# Patient Record
Sex: FEMALE | Race: OTHER | NOT HISPANIC OR LATINO | URBAN - METROPOLITAN AREA
[De-identification: names, ages, dates, MRNs, and addresses within clinical notes are randomized per-mention and may not be internally consistent; named-entity substitution may affect disease eponyms.]

---

## 2024-10-23 ENCOUNTER — OUTPATIENT (OUTPATIENT)
Dept: OUTPATIENT SERVICES | Facility: HOSPITAL | Age: 59
LOS: 1 days | End: 2024-10-23
Payer: MEDICARE

## 2024-10-23 VITALS
WEIGHT: 162.04 LBS | OXYGEN SATURATION: 97 % | RESPIRATION RATE: 18 BRPM | HEART RATE: 81 BPM | TEMPERATURE: 98 F | HEIGHT: 59 IN | SYSTOLIC BLOOD PRESSURE: 109 MMHG | DIASTOLIC BLOOD PRESSURE: 73 MMHG

## 2024-10-23 DIAGNOSIS — Z01.818 ENCOUNTER FOR OTHER PREPROCEDURAL EXAMINATION: ICD-10-CM

## 2024-10-23 DIAGNOSIS — Z96.651 PRESENCE OF RIGHT ARTIFICIAL KNEE JOINT: Chronic | ICD-10-CM

## 2024-10-23 DIAGNOSIS — Z96.89 PRESENCE OF OTHER SPECIFIED FUNCTIONAL IMPLANTS: Chronic | ICD-10-CM

## 2024-10-23 DIAGNOSIS — M17.12 UNILATERAL PRIMARY OSTEOARTHRITIS, LEFT KNEE: ICD-10-CM

## 2024-10-23 LAB
A1C WITH ESTIMATED AVERAGE GLUCOSE RESULT: 5.9 % — HIGH (ref 4–5.6)
BLD GP AB SCN SERPL QL: SIGNIFICANT CHANGE UP
ESTIMATED AVERAGE GLUCOSE: 123 MG/DL — HIGH (ref 68–114)
MRSA PCR RESULT.: DETECTED
S AUREUS DNA NOSE QL NAA+PROBE: DETECTED

## 2024-10-23 NOTE — H&P PST ADULT - NEGATIVE PSYCHIATRIC SYMPTOMS
no suicidal ideation/no mood swings/no agitation/no visual hallucinations/no auditory hallucinations

## 2024-10-23 NOTE — H&P PST ADULT - NEUROLOGICAL SYMPTOMS
migraine headache/vertigo/difficulty walking/headache migraine headache associated with vertigo and nausea/vertigo/difficulty walking/headache

## 2024-10-23 NOTE — H&P PST ADULT - PROBLEM SELECTOR PLAN 10
Latex:  Patient denies allergy to latex.  Medications reviewed with patient.  Tobacco use verified.  Allergies verified.    REFERRING MD:  Dr. 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  States that he has constant pain.  Has questions regarding PT~ states that he had an initial visit but he is \"waiting on an ok from insurance.\"    He is taking Gabapentin, Hydrocodone for pain.  Morphine caused nausea, ondansetron gave relief.    Would like some help with his brace.       s/p spinal cord stimulator on 8/2020.  Follow-up with Providers for management.

## 2024-10-23 NOTE — H&P PST ADULT - PROBLEM SELECTOR PLAN 6
Instructed to continue use of inhalers and use same on day of surgery.   Instructed to bring inhalers to hospital on day of surgery.  Follow-up with PCP for management if symptoms worsen.

## 2024-10-23 NOTE — H&P PST ADULT - NSICDXPASTMEDICALHX_GEN_ALL_CORE_FT
PAST MEDICAL HISTORY:  Anxiety     Asthma     Constipation     Depression     Heartburn     Migraine headache     Primary osteoarthritis of right knee     Psoriatic arthritis     Rheumatoid arthritis      PAST MEDICAL HISTORY:  Allergic rhinitis     Anxiety     Asthma     CAD (coronary artery disease)     Constipation     Depression     Fibromyalgia     GERD (gastroesophageal reflux disease)     Heartburn     Migraine headache     Primary osteoarthritis of right knee     Psoriatic arthritis     Rheumatoid arthritis

## 2024-10-23 NOTE — H&P PST ADULT - NSICDXPASTSURGICALHX_GEN_ALL_CORE_FT
PAST SURGICAL HISTORY:  Ankle fracture right ankle arthroscopy    DJD (degenerative joint disease) of cervical spine Plate and screws and 2 artificial discs.  Lumbar spine danilo and screws    H/O abdominal hysterectomy     H/O tubal ligation     History of arthroscopy of left shoulder     History of total knee replacement, right     S/P insertion of spinal cord stimulator     S/P right knee arthroscopy x 3     PAST SURGICAL HISTORY:  Ankle fracture right ankle arthroscopy    DJD (degenerative joint disease) of cervical spine Plate and screws and 2 artificial discs.  Lumbar spine danilo and screws    H/O abdominal hysterectomy     H/O tubal ligation     History of arthroscopy of left shoulder     History of laparoscopic cholecystectomy     History of total knee replacement, right     S/P insertion of spinal cord stimulator     S/P right knee arthroscopy x 3

## 2024-10-23 NOTE — H&P PST ADULT - NSICDXPROCEDURE_GEN_ALL_CORE_FT
PROCEDURES:  Right total knee replacement 23-Oct-2024 07:33:58  Sandie Martinez   PROCEDURES:  Left total knee replacement 23-Oct-2024 19:35:48  Sandie Martinez

## 2024-10-23 NOTE — H&P PST ADULT - PROBLEM SELECTOR PLAN 5
Instructed pt to continue meds, to take them with sips of water on day of surgery and to  follow-up with Provider for management.  Discussed with pt the availability of the suicide and crisis lifeline 988 that can be used in times of crisis.  Pt receptive to information.

## 2024-10-23 NOTE — H&P PST ADULT - DOES PATIENT HAVE ADVANCE DIRECTIVE
Pt's son Alfonzo [phone] and son Edgar Solitario [phone] will make decisions in case of emergency/No Pt's brother Guille [phone] and son Edgar Altaf [phone] will make decisions in case of emergency/No

## 2024-10-23 NOTE — H&P PST ADULT - NSICDXFAMILYHX_GEN_ALL_CORE_FT
FAMILY HISTORY:  Father  Still living? Yes, Estimated age: Age Unknown  Family history of rheumatoid arthritis, Age at diagnosis: Age Unknown  Family history of stroke, Age at diagnosis: Age Unknown  No family history of cardiac disease, Age at diagnosis: Age Unknown     FAMILY HISTORY:  Father  Still living? Yes, Estimated age: Age Unknown  Family history of heart disease, Age at diagnosis: Age Unknown  Family history of hypertension, Age at diagnosis: Age Unknown  Family history of rheumatoid arthritis, Age at diagnosis: Age Unknown  Family history of stroke, Age at diagnosis: Age Unknown    Mother  Still living? Yes, Estimated age: Age Unknown  Family history of diabetes mellitus, Age at diagnosis: Age Unknown  Family history of hypertension, Age at diagnosis: Age Unknown  Family history of ovarian cancer, Age at diagnosis: Age Unknown

## 2024-10-23 NOTE — H&P PST ADULT - NSANTHOSAYNRD_GEN_A_CORE
No. SHEBA screening performed.  STOP BANG Legend: 0-2 = LOW Risk; 3-4 = INTERMEDIATE Risk; 5-8 = HIGH Risk

## 2024-10-23 NOTE — CHART NOTE - NSCHARTNOTEFT_GEN_A_CORE
PRE-TJR SOCIAL/FUNCTIONAL SCREENING Via:    In Person Meet  on 10/23/2024:  Preferred Language:  English  Patient Telephone #:  [phone]  EMAIL ADDRESS:  [email or web address]  Insurance:  Medicare  Pt stated Goal for Surgery :  Walk m ore   SURGERY DETAILS:  Sx Date:  10/29/2024                                                                                           Surgery Type:  Left Knee Replacement  (had right knee done in 2015)  Surgeon: Dr. Laurie ESCALONA Tool:11/12 (directly home)  Attitude towards SDD:  Pt scheduled for SDA    SOCIAL HISTORY:  Live With: Alone in [facility] however will be staying with her son after surgery in an apartment without an elevator    Stairs Required to Access (Street to Front Door) Residence:   None, patient has ramp to get into the building    Once Inside Pt Residence:   To Access a Bathroom:      Must Go Up 13 Stairs  To Access a Bedroom Where Pt. Can Sleep:     Must Go Up 13 Stairs  Pt. Currently Has Formal Home Health Services:  No. Patient is requesting a home health aid post-op to assist, also interested in CDPAP if possible       MOBILITY HISTORY:   Baseline Ambulation- Pt is Independent in ambulation without assistive device:   Pt. Owns Any Other Medical Equipment?  No, patient will need rolling walker and 3-in-1 commode however it will be delivered in the next few days.    MEDICAL HISTORY:  Pt has any History of Back Surgery:   Yes; Had both cervical and lumbar surgeries and a spinal stimulator  Pt has any Allergies:    Yes; penicillin, Bactrim, Sulfa drugs  Patient denies diagnosis of sleep apnea or use of CPAP    Pt. Pharmacy Information:  [facility]   [facility]                 Address:        [address] [place] [address]  Telephone #: [phone]    Pt. will Require Transportation to Home Upon Discharge: No // Yes;  will be Made Aware:    For Post-Acute Care:  Pt. prefers [facility] for post-acute needs ////      Pt is Interested in a Different Care Provider    Pt electronically sent pre- op education book "What to do before and after knee replacement".  All details of upcoming procedure discussed including, precautions, the throughput process, post-op process including transportation, home-care and follow-up as well as important information regarding blood clots, pneumonia, falls, infection and pain.  All questions answered and pt. verbalized understanding.  Pt. has my phone number for follow up as needed. PRE-TJR SOCIAL/FUNCTIONAL SCREENING Via:    In Person Meet  on 10/23/2024:  Preferred Language:  English  Patient Telephone #:  [phone]  EMAIL ADDRESS:  [email or web address]  Insurance:  Medicare  Pt stated Goal for Surgery :  Walk m ore   SURGERY DETAILS:  Sx Date:  10/29/2024                                                                                           Surgery Type:  Left Knee Replacement  (had right knee done in 2015)  Surgeon: Dr. Laurie ESCALONA Tool:11/12 (directly home)  Attitude towards SDD:  Pt scheduled for SDA    SOCIAL HISTORY:  Live With: Alone in [facility] however will be staying with her son after surgery in an apartment without an elevator    Stairs Required to Access (Street to Front Door) Residence:   None, patient has ramp to get into the building    Once Inside Pt Residence:   To Access a Bathroom:      Must Go Up 13 Stairs  To Access a Bedroom Where Pt. Can Sleep:     Must Go Up 13 Stairs  Pt. Currently Has Formal Home Health Services:  No. Patient is requesting a home health aid post-op to assist, also interested in CDPAP if possible       MOBILITY HISTORY:   Baseline Ambulation- Pt is Independent in ambulation without assistive device:   Pt. Owns Any Other Medical Equipment?  No, patient will need rolling walker and 3-in-1 commode however it will be delivered in the next few days.    MEDICAL HISTORY:  Pt has any History of Back Surgery:   Yes; Had both cervical and lumbar surgeries and a spinal stimulator  Pt has any Allergies:    Yes; penicillin, Bactrim, Sulfa drugs  Patient denies diagnosis of sleep apnea or use of CPAP    Pt. Pharmacy Information:  Name:   [facility]                 Address:        [address] [place] [address]  Telephone #: [phone]    Pt. will Require Transportation to Home Upon Discharge: No, son will drive patient home    For Post-Acute Care:  Pt. prefers [facility] Home for post-acute needs    Pt electronically sent pre- op education book "What to do before and after knee replacement".  All details of upcoming procedure discussed including, precautions, the throughput process, post-op process including transportation, home-care and follow-up as well as important information regarding blood clots, pneumonia, falls, infection and pain.  Stretching strap provided for home exercise program.  All questions answered and pt. verbalized understanding.  Pt. has my phone number for follow up as needed.

## 2024-10-23 NOTE — H&P PST ADULT - PROBLEM SELECTOR PLAN 4
Instructed pt to continue meds, to avoid NSAIDS, and to follow-up with PCP/pain management if symptoms worsen.

## 2024-10-23 NOTE — H&P PST ADULT - PROBLEM SELECTOR PLAN 8
Instructed pt to continue meds, to avoid NSAIDS, and to follow-up with PCP/provider if symptoms worsen.

## 2024-10-23 NOTE — H&P PST ADULT - REASON FOR ADMISSION
left knee pain  Pt's goalis to walk better and to be pain free. left knee pain  Pt's goal is to walk better and to be pain free.

## 2024-10-23 NOTE — H&P PST ADULT - PROBLEM SELECTOR PLAN 2
Pt with mild CAD-on aspirin.  Optimized for procedure by cardiology.  Instructed to hold aspirin as per cardiologist's advice.  pt to follow-up with cardiology for management.

## 2024-10-23 NOTE — H&P PST ADULT - HISTORY OF PRESENT ILLNESS
This is a 59 yr old  female with PMH of presents with c/o knee pain due to osteoarthritis.Pt reports worsening of pain with prolonged ambulation and standing. Pt is scheduled for left total knee replacement on 10/29/24.   This is a 59 yr old  female with PMH of HLD, fibromyalgia, psoriatic arthritis, allergic rhinitis, GERD, migraine headache, anxiety, depression, PTSD, RA, asthma, PSAH of cervical spine fusion, lumbar fusion, low back pain -s/p spinal cord stimulator insertion on 8/13/20, who presents with c/o left knee pain due to osteoarthritis. Pt reports worsening of pain with prolonged ambulation and standing. Pt is scheduled for left total knee replacement on 10/29/24.   This is a 59 yr old  female with PMH of  mild CAD-on aspirin, HLD, fibromyalgia, psoriatic arthritis, allergic rhinitis, GERD, migraine headache, anxiety, depression, PTSD, RA, asthma, PSH of cervical spine fusion, lumbar fusion, low back pain -s/p spinal cord stimulator insertion on 8/13/20, who presents with c/o left knee pain due to osteoarthritis. Pt reports worsening of pain with prolonged ambulation and standing. Pt is scheduled for left total knee replacement on 10/29/24.   This is a 59 yr old  female with PMH of  mild CAD-on aspirin, HLD, fibromyalgia, psoriatic arthritis, allergic rhinitis, GERD, migraine headache associated with vertigo and nausea, anxiety, depression, PTSD, RA, asthma, PSH of cervical spine fusion, lumbar fusion, low back pain -s/p spinal cord stimulator insertion on 8/13/20, who presents with c/o left knee pain due to osteoarthritis. Pt reports worsening of pain with prolonged ambulation and standing. Pt is scheduled for left total knee replacement on 10/29/24.

## 2024-10-23 NOTE — H&P PST ADULT - MUSCULOSKELETAL
ROM intact/joint swelling/abnormal gait details… left knee/decreased ROM due to pain/joint swelling/abnormal gait

## 2024-10-23 NOTE — H&P PST ADULT - PROBLEM SELECTOR PLAN 1
Pt is scheduled for left total knee replacement on 10/29/24.  SHEBA stop bang score 3. Pt denies history of SHEBA, never did sleep study.  Preoperative instructions discussed with pt and copy given to pt.  Instructed pt not to eat or drink anything after midnight the night before the surgery, to avoid NSAIDs such as Ibuprofen, Motrin, Aleve, Advil, Naproxen before surgery, to take Tylenol if needed for pain, to report if she has been exposed to any one with any contagious diseases including Covid-19 or if she is exhibiting any symptoms of COVID-19.   pt swabbed for MRSA/MSSA.  Instructed pt to shower with Chlorhexidine 4% soap for 3 days including the morning of surgery to prevent infection and Mupirocin nasal ointment if nasal swab is positive for MSSA/MRSA before surgery. Verbalized understanding of instructions given via teach back method. Pt is scheduled for left total knee replacement on 10/29/24.  SHEBA stop bang score 3. Pt denies history of SHEBA, never did sleep study.  Preoperative instructions discussed with pt and copy given to pt.  Instructed pt not to eat or drink anything after midnight the night before the surgery, to avoid NSAIDs such as Ibuprofen, Motrin, Aleve, Advil, Naproxen before surgery, to take Tylenol if needed for pain, to report if she has been exposed to any one with any contagious diseases including Covid-19 or if she is exhibiting any symptoms of COVID-19.   Pt swabbed for MRSA/MSSA.  Instructed pt to shower with Chlorhexidine 4% soap for 3 days including the morning of surgery to prevent infection and Mupirocin nasal ointment if nasal swab is positive for MSSA/MRSA before surgery. Verbalized understanding of instructions given via teach back method.

## 2024-10-23 NOTE — H&P PST ADULT - NEGATIVE NEUROLOGICAL SYMPTOMS
no weakness/no paresthesias/no generalized seizures/no focal seizures/no syncope/no tremors/no loss of sensation

## 2024-10-23 NOTE — H&P PST ADULT - ASSESSMENT
This is a 59 yr old  female with PMH of HLD, fibromyalgia, psoriatic arthritis, allergic rhinitis, GERD, migraine headache, anxiety, depression, PTSD, RA, asthma, PSAH of cervical spine fusion, lumbar fusion, low back pain -s/p spinal cord stimulator insertion on 8/13/20, who presents with c/o left knee pain due to osteoarthritis. Pt reports worsening of pain with prolonged ambulation and standing. Pt is scheduled for left total knee replacement on 10/29/24.  SHEBA stop bang score 3. Pt denies history of SHEBA, never did sleep study.   This is a 59 yr old  female with PMH of  mild CAD-on aspirin, HLD, fibromyalgia, psoriatic arthritis, allergic rhinitis, GERD, migraine headache, anxiety, depression, PTSD, RA, asthma, PSH of cervical spine fusion, lumbar fusion, low back pain -s/p spinal cord stimulator insertion on 8/13/20, who presents with primary osteoarthritis of left knee. Pt is scheduled for left total knee replacement on 10/29/24.  SHEBA stop bang score 3. Pt denies history of SHEBA, never did sleep study.   This is a 59 yr old  female with PMH of  mild CAD-on aspirin, HLD, fibromyalgia, psoriatic arthritis, allergic rhinitis, GERD, migraine headache associated with vertigo and nausea, anxiety, depression, PTSD, RA, asthma, PSH of cervical spine fusion, lumbar fusion, low back pain -s/p spinal cord stimulator insertion on 8/13/20, who presents with primary osteoarthritis of left knee. Pt is scheduled for left total knee replacement on 10/29/24.  SHEBA stop bang score 3. Pt denies history of SHEBA, never did sleep study.

## 2024-10-24 DIAGNOSIS — J30.9 ALLERGIC RHINITIS, UNSPECIFIED: ICD-10-CM

## 2024-10-24 DIAGNOSIS — I25.10 ATHEROSCLEROTIC HEART DISEASE OF NATIVE CORONARY ARTERY WITHOUT ANGINA PECTORIS: ICD-10-CM

## 2024-10-24 DIAGNOSIS — E78.5 HYPERLIPIDEMIA, UNSPECIFIED: ICD-10-CM

## 2024-10-24 DIAGNOSIS — Z90.49 ACQUIRED ABSENCE OF OTHER SPECIFIED PARTS OF DIGESTIVE TRACT: Chronic | ICD-10-CM

## 2024-10-24 DIAGNOSIS — F32.89 OTHER SPECIFIED DEPRESSIVE EPISODES: ICD-10-CM

## 2024-10-24 DIAGNOSIS — M19.90 UNSPECIFIED OSTEOARTHRITIS, UNSPECIFIED SITE: ICD-10-CM

## 2024-10-24 DIAGNOSIS — M79.7 FIBROMYALGIA: ICD-10-CM

## 2024-10-24 DIAGNOSIS — J45.909 UNSPECIFIED ASTHMA, UNCOMPLICATED: ICD-10-CM

## 2024-10-24 DIAGNOSIS — M54.50 LOW BACK PAIN, UNSPECIFIED: ICD-10-CM

## 2024-10-24 DIAGNOSIS — G43.909 MIGRAINE, UNSPECIFIED, NOT INTRACTABLE, WITHOUT STATUS MIGRAINOSUS: ICD-10-CM

## 2024-10-25 RX ORDER — ALBUTEROL 90 MCG
2 AEROSOL (GRAM) INHALATION
Refills: 0 | DISCHARGE

## 2024-10-25 RX ORDER — MUPIROCIN 20 MG/G
1 OINTMENT TOPICAL
Qty: 2 | Refills: 0
Start: 2024-10-25 | End: 2024-10-29

## 2024-10-29 ENCOUNTER — INPATIENT (INPATIENT)
Facility: HOSPITAL | Age: 59
LOS: 2 days | Discharge: HOME HEALTH | DRG: 470 | End: 2024-11-01
Attending: ORTHOPAEDIC SURGERY | Admitting: ORTHOPAEDIC SURGERY
Payer: MEDICARE

## 2024-10-29 ENCOUNTER — TRANSCRIPTION ENCOUNTER (OUTPATIENT)
Age: 59
End: 2024-10-29

## 2024-10-29 VITALS
WEIGHT: 166.89 LBS | HEIGHT: 59 IN | RESPIRATION RATE: 16 BRPM | HEART RATE: 79 BPM | DIASTOLIC BLOOD PRESSURE: 69 MMHG | OXYGEN SATURATION: 95 % | TEMPERATURE: 98 F | SYSTOLIC BLOOD PRESSURE: 104 MMHG

## 2024-10-29 DIAGNOSIS — Z90.49 ACQUIRED ABSENCE OF OTHER SPECIFIED PARTS OF DIGESTIVE TRACT: Chronic | ICD-10-CM

## 2024-10-29 DIAGNOSIS — M17.12 UNILATERAL PRIMARY OSTEOARTHRITIS, LEFT KNEE: ICD-10-CM

## 2024-10-29 DIAGNOSIS — Z96.651 PRESENCE OF RIGHT ARTIFICIAL KNEE JOINT: Chronic | ICD-10-CM

## 2024-10-29 DIAGNOSIS — Z96.89 PRESENCE OF OTHER SPECIFIED FUNCTIONAL IMPLANTS: Chronic | ICD-10-CM

## 2024-10-29 LAB
ANION GAP SERPL CALC-SCNC: 7 MMOL/L — SIGNIFICANT CHANGE UP (ref 5–17)
BLD GP AB SCN SERPL QL: SIGNIFICANT CHANGE UP
BUN SERPL-MCNC: 10 MG/DL — SIGNIFICANT CHANGE UP (ref 7–18)
CALCIUM SERPL-MCNC: 8.9 MG/DL — SIGNIFICANT CHANGE UP (ref 8.4–10.5)
CHLORIDE SERPL-SCNC: 109 MMOL/L — HIGH (ref 96–108)
CO2 SERPL-SCNC: 25 MMOL/L — SIGNIFICANT CHANGE UP (ref 22–31)
CREAT SERPL-MCNC: 0.7 MG/DL — SIGNIFICANT CHANGE UP (ref 0.5–1.3)
EGFR: 100 ML/MIN/1.73M2 — SIGNIFICANT CHANGE UP
GLUCOSE SERPL-MCNC: 123 MG/DL — HIGH (ref 70–99)
HCT VFR BLD CALC: 35.2 % — SIGNIFICANT CHANGE UP (ref 34.5–45)
HGB BLD-MCNC: 12 G/DL — SIGNIFICANT CHANGE UP (ref 11.5–15.5)
MCHC RBC-ENTMCNC: 29.9 PG — SIGNIFICANT CHANGE UP (ref 27–34)
MCHC RBC-ENTMCNC: 34.1 G/DL — SIGNIFICANT CHANGE UP (ref 32–36)
MCV RBC AUTO: 87.8 FL — SIGNIFICANT CHANGE UP (ref 80–100)
NRBC # BLD: 0 /100 WBCS — SIGNIFICANT CHANGE UP (ref 0–0)
PLATELET # BLD AUTO: 255 K/UL — SIGNIFICANT CHANGE UP (ref 150–400)
POTASSIUM SERPL-MCNC: 3.6 MMOL/L — SIGNIFICANT CHANGE UP (ref 3.5–5.3)
POTASSIUM SERPL-SCNC: 3.6 MMOL/L — SIGNIFICANT CHANGE UP (ref 3.5–5.3)
RBC # BLD: 4.01 M/UL — SIGNIFICANT CHANGE UP (ref 3.8–5.2)
RBC # FLD: 14 % — SIGNIFICANT CHANGE UP (ref 10.3–14.5)
SODIUM SERPL-SCNC: 141 MMOL/L — SIGNIFICANT CHANGE UP (ref 135–145)
WBC # BLD: 13.26 K/UL — HIGH (ref 3.8–10.5)
WBC # FLD AUTO: 13.26 K/UL — HIGH (ref 3.8–10.5)

## 2024-10-29 PROCEDURE — 27350 REMOVAL OF KNEECAP: CPT | Mod: AS,59,LT

## 2024-10-29 PROCEDURE — 88305 TISSUE EXAM BY PATHOLOGIST: CPT | Mod: 26

## 2024-10-29 PROCEDURE — 73560 X-RAY EXAM OF KNEE 1 OR 2: CPT | Mod: 26

## 2024-10-29 PROCEDURE — 88311 DECALCIFY TISSUE: CPT | Mod: 26

## 2024-10-29 PROCEDURE — 20900 REMOVAL OF BONE FOR GRAFT: CPT | Mod: AS

## 2024-10-29 PROCEDURE — 73560 X-RAY EXAM OF KNEE 1 OR 2: CPT | Mod: 26,LT

## 2024-10-29 PROCEDURE — 27447 TOTAL KNEE ARTHROPLASTY: CPT | Mod: AS,LT

## 2024-10-29 DEVICE — BASEPLATE PRIMARY TIB CMNTD SZ 2: Type: IMPLANTABLE DEVICE | Site: LEFT | Status: FUNCTIONAL

## 2024-10-29 DEVICE — IMP PATELLA ASYMMETRIC X3 29X9MM: Type: IMPLANTABLE DEVICE | Site: LEFT | Status: FUNCTIONAL

## 2024-10-29 DEVICE — CEMENT SIMPLEX P 40GM: Type: IMPLANTABLE DEVICE | Site: LEFT | Status: FUNCTIONAL

## 2024-10-29 DEVICE — ARISTA 3GR: Type: IMPLANTABLE DEVICE | Site: LEFT | Status: FUNCTIONAL

## 2024-10-29 DEVICE — COMP FEM TRIATHLON PS SZ 2 LT: Type: IMPLANTABLE DEVICE | Site: LEFT | Status: FUNCTIONAL

## 2024-10-29 DEVICE — STRYKER PIN 1/8 X 3.5" LONG: Type: IMPLANTABLE DEVICE | Site: LEFT | Status: FUNCTIONAL

## 2024-10-29 DEVICE — INSERT TIB BEARING PS X3 SZ 2 9MM: Type: IMPLANTABLE DEVICE | Site: LEFT | Status: FUNCTIONAL

## 2024-10-29 DEVICE — FEM DIST FIXATION PEG MOD TKS: Type: IMPLANTABLE DEVICE | Site: LEFT | Status: FUNCTIONAL

## 2024-10-29 RX ORDER — BUSPIRONE HYDROCHLORIDE 15 MG/1
1 TABLET ORAL
Refills: 0 | DISCHARGE

## 2024-10-29 RX ORDER — TRAMADOL HYDROCHLORIDE 50 MG/1
50 TABLET, COATED ORAL ONCE
Refills: 0 | Status: DISCONTINUED | OUTPATIENT
Start: 2024-10-29 | End: 2024-10-29

## 2024-10-29 RX ORDER — CHOLESTYRAMINE 4 G
4 POWDER IN PACKET (EA) ORAL DAILY
Refills: 0 | Status: DISCONTINUED | OUTPATIENT
Start: 2024-10-29 | End: 2024-11-01

## 2024-10-29 RX ORDER — ACETAMINOPHEN 500 MG
1000 TABLET ORAL EVERY 6 HOURS
Refills: 0 | Status: COMPLETED | OUTPATIENT
Start: 2024-10-29 | End: 2024-10-30

## 2024-10-29 RX ORDER — DARIDOREXANT 50 MG/1
1 TABLET, FILM COATED ORAL
Refills: 0 | DISCHARGE

## 2024-10-29 RX ORDER — ALBUTEROL 90 MCG
2 AEROSOL (GRAM) INHALATION EVERY 6 HOURS
Refills: 0 | Status: DISCONTINUED | OUTPATIENT
Start: 2024-10-29 | End: 2024-11-01

## 2024-10-29 RX ORDER — POLYETHYLENE GLYCOL 3350 17 G/17G
17 POWDER, FOR SOLUTION ORAL AT BEDTIME
Refills: 0 | Status: DISCONTINUED | OUTPATIENT
Start: 2024-10-29 | End: 2024-11-01

## 2024-10-29 RX ORDER — DIPHENHYDRAMINE HCL 12.5MG/5ML
25 ELIXIR ORAL ONCE
Refills: 0 | Status: COMPLETED | OUTPATIENT
Start: 2024-10-29 | End: 2024-10-29

## 2024-10-29 RX ORDER — MAGNESIUM HYDROXIDE 1200 MG/15ML
30 SUSPENSION ORAL DAILY
Refills: 0 | Status: DISCONTINUED | OUTPATIENT
Start: 2024-10-29 | End: 2024-11-01

## 2024-10-29 RX ORDER — FLUTICASONE PROPIONATE AND SALMETEROL XINAFOATE 230; 21 UG/1; UG/1
1 AEROSOL, METERED RESPIRATORY (INHALATION)
Refills: 0 | Status: DISCONTINUED | OUTPATIENT
Start: 2024-10-29 | End: 2024-11-01

## 2024-10-29 RX ORDER — HYDROMORPHONE HCL/0.9% NACL/PF 6 MG/30 ML
0.5 PATIENT CONTROLLED ANALGESIA SYRINGE INTRAVENOUS
Refills: 0 | Status: DISCONTINUED | OUTPATIENT
Start: 2024-10-29 | End: 2024-10-29

## 2024-10-29 RX ORDER — BUPROPION HCL 150 MG
300 TABLET,SUSTAINED-RELEASE 12 HR ORAL DAILY
Refills: 0 | Status: DISCONTINUED | OUTPATIENT
Start: 2024-10-29 | End: 2024-11-01

## 2024-10-29 RX ORDER — BUSPIRONE HYDROCHLORIDE 15 MG/1
10 TABLET ORAL EVERY 12 HOURS
Refills: 0 | Status: DISCONTINUED | OUTPATIENT
Start: 2024-10-29 | End: 2024-11-01

## 2024-10-29 RX ORDER — KETOROLAC TROMETHAMINE 30 MG/ML
15 INJECTION INTRAMUSCULAR; INTRAVENOUS ONCE
Refills: 0 | Status: DISCONTINUED | OUTPATIENT
Start: 2024-10-29 | End: 2024-10-29

## 2024-10-29 RX ORDER — ONDANSETRON HYDROCHLORIDE 2 MG/ML
1 INJECTION, SOLUTION INTRAMUSCULAR; INTRAVENOUS
Refills: 0 | DISCHARGE

## 2024-10-29 RX ORDER — ASPIRIN/MAG CARB/ALUMINUM AMIN 325 MG
1 TABLET ORAL
Refills: 0 | DISCHARGE

## 2024-10-29 RX ORDER — CLINDAMYCIN PHOSPHATE 150 MG/ML
600 VIAL (ML) INJECTION ONCE
Refills: 0 | Status: COMPLETED | OUTPATIENT
Start: 2024-10-29 | End: 2024-10-29

## 2024-10-29 RX ORDER — CLINDAMYCIN PHOSPHATE 150 MG/ML
900 VIAL (ML) INJECTION EVERY 8 HOURS
Refills: 0 | Status: DISCONTINUED | OUTPATIENT
Start: 2024-10-29 | End: 2024-10-29

## 2024-10-29 RX ORDER — MECLIZINE HCL 25 MG
1 TABLET ORAL
Refills: 0 | DISCHARGE

## 2024-10-29 RX ORDER — PRAZOSIN HCL 1 MG
1 CAPSULE ORAL
Refills: 0 | DISCHARGE

## 2024-10-29 RX ORDER — HYDROMORPHONE HCL/0.9% NACL/PF 6 MG/30 ML
0.5 PATIENT CONTROLLED ANALGESIA SYRINGE INTRAVENOUS
Refills: 0 | Status: COMPLETED | OUTPATIENT
Start: 2024-10-29 | End: 2024-10-29

## 2024-10-29 RX ORDER — HYDROMORPHONE HCL/0.9% NACL/PF 6 MG/30 ML
1 PATIENT CONTROLLED ANALGESIA SYRINGE INTRAVENOUS
Refills: 0 | Status: DISCONTINUED | OUTPATIENT
Start: 2024-10-29 | End: 2024-10-29

## 2024-10-29 RX ORDER — DOXAZOSIN MESYLATE 8 MG
1 TABLET ORAL AT BEDTIME
Refills: 0 | Status: DISCONTINUED | OUTPATIENT
Start: 2024-10-29 | End: 2024-11-01

## 2024-10-29 RX ORDER — FAMOTIDINE 10 MG/ML
1 INJECTION INTRAVENOUS
Refills: 0 | DISCHARGE

## 2024-10-29 RX ORDER — FLUTICASONE PROPIONATE 50 UG/1
1 SPRAY, METERED NASAL
Refills: 0 | Status: DISCONTINUED | OUTPATIENT
Start: 2024-10-29 | End: 2024-11-01

## 2024-10-29 RX ORDER — SENNA 187 MG
2 TABLET ORAL AT BEDTIME
Refills: 0 | Status: DISCONTINUED | OUTPATIENT
Start: 2024-10-29 | End: 2024-11-01

## 2024-10-29 RX ORDER — TRAMADOL HYDROCHLORIDE 50 MG/1
1 TABLET, COATED ORAL
Refills: 0 | DISCHARGE

## 2024-10-29 RX ORDER — FERROUS SULFATE 325(65) MG
325 TABLET ORAL DAILY
Refills: 0 | Status: DISCONTINUED | OUTPATIENT
Start: 2024-10-29 | End: 2024-11-01

## 2024-10-29 RX ORDER — ASCORBIC ACID 500 MG
500 TABLET ORAL DAILY
Refills: 0 | Status: DISCONTINUED | OUTPATIENT
Start: 2024-10-29 | End: 2024-11-01

## 2024-10-29 RX ORDER — MILNACIPRAN HYDROCHLORIDE 12.5 MG/1
1 TABLET, FILM COATED ORAL
Refills: 0 | DISCHARGE

## 2024-10-29 RX ORDER — MONTELUKAST SODIUM 10 MG/1
10 TABLET, FILM COATED ORAL DAILY
Refills: 0 | Status: DISCONTINUED | OUTPATIENT
Start: 2024-10-29 | End: 2024-11-01

## 2024-10-29 RX ORDER — SODIUM CHLORIDE 9 MG/ML
1000 INJECTION, SOLUTION INTRAMUSCULAR; INTRAVENOUS; SUBCUTANEOUS
Refills: 0 | Status: DISCONTINUED | OUTPATIENT
Start: 2024-10-29 | End: 2024-11-01

## 2024-10-29 RX ORDER — BUPROPION HCL 150 MG
1 TABLET,SUSTAINED-RELEASE 12 HR ORAL
Refills: 0 | DISCHARGE

## 2024-10-29 RX ORDER — KETOROLAC TROMETHAMINE 30 MG/ML
30 INJECTION INTRAMUSCULAR; INTRAVENOUS EVERY 6 HOURS
Refills: 0 | Status: DISCONTINUED | OUTPATIENT
Start: 2024-10-29 | End: 2024-10-30

## 2024-10-29 RX ORDER — CHOLESTYRAMINE 4 G
4 POWDER IN PACKET (EA) ORAL
Refills: 0 | DISCHARGE

## 2024-10-29 RX ORDER — ACETAMINOPHEN 500 MG
975 TABLET ORAL ONCE
Refills: 0 | Status: COMPLETED | OUTPATIENT
Start: 2024-10-29 | End: 2024-10-29

## 2024-10-29 RX ORDER — ENOXAPARIN SODIUM 40MG/0.4ML
30 SYRINGE (ML) SUBCUTANEOUS EVERY 12 HOURS
Refills: 0 | Status: DISCONTINUED | OUTPATIENT
Start: 2024-10-30 | End: 2024-11-01

## 2024-10-29 RX ORDER — MECLIZINE HCL 25 MG
25 TABLET ORAL THREE TIMES A DAY
Refills: 0 | Status: DISCONTINUED | OUTPATIENT
Start: 2024-10-29 | End: 2024-11-01

## 2024-10-29 RX ORDER — CHLORHEXIDINE GLUCONATE 40 MG/ML
1 SOLUTION TOPICAL ONCE
Refills: 0 | Status: COMPLETED | OUTPATIENT
Start: 2024-10-29 | End: 2024-10-29

## 2024-10-29 RX ORDER — ASPIRIN/MAG CARB/ALUMINUM AMIN 325 MG
81 TABLET ORAL DAILY
Refills: 0 | Status: DISCONTINUED | OUTPATIENT
Start: 2024-10-29 | End: 2024-11-01

## 2024-10-29 RX ORDER — FAMOTIDINE 10 MG/ML
40 INJECTION INTRAVENOUS DAILY
Refills: 0 | Status: DISCONTINUED | OUTPATIENT
Start: 2024-10-29 | End: 2024-11-01

## 2024-10-29 RX ORDER — TRAMADOL HYDROCHLORIDE 50 MG/1
50 TABLET, COATED ORAL EVERY 8 HOURS
Refills: 0 | Status: DISCONTINUED | OUTPATIENT
Start: 2024-10-29 | End: 2024-11-01

## 2024-10-29 RX ORDER — OXYCODONE HYDROCHLORIDE 30 MG/1
5 TABLET ORAL EVERY 4 HOURS
Refills: 0 | Status: DISCONTINUED | OUTPATIENT
Start: 2024-10-29 | End: 2024-10-30

## 2024-10-29 RX ORDER — ATOGEPANT 60 MG/1
1 TABLET ORAL
Refills: 0 | DISCHARGE

## 2024-10-29 RX ORDER — ONDANSETRON HYDROCHLORIDE 2 MG/ML
4 INJECTION, SOLUTION INTRAMUSCULAR; INTRAVENOUS EVERY 6 HOURS
Refills: 0 | Status: DISCONTINUED | OUTPATIENT
Start: 2024-10-29 | End: 2024-11-01

## 2024-10-29 RX ORDER — BUTALBITAL, ACETAMINOPHEN AND CAFFEINE 50; 325; 40 MG/1; MG/1; MG/1
1 CAPSULE ORAL EVERY 6 HOURS
Refills: 0 | Status: DISCONTINUED | OUTPATIENT
Start: 2024-10-29 | End: 2024-11-01

## 2024-10-29 RX ORDER — CEFTRIAXONE SODIUM 10 G
1000 VIAL (EA) INJECTION ONCE
Refills: 0 | Status: COMPLETED | OUTPATIENT
Start: 2024-10-29 | End: 2024-10-29

## 2024-10-29 RX ADMIN — Medication 0.5 MILLIGRAM(S): at 18:00

## 2024-10-29 RX ADMIN — CHLORHEXIDINE GLUCONATE 1 APPLICATION(S): 40 SOLUTION TOPICAL at 11:22

## 2024-10-29 RX ADMIN — Medication 1000 MILLIGRAM(S): at 22:42

## 2024-10-29 RX ADMIN — Medication 20 MILLIGRAM(S): at 22:43

## 2024-10-29 RX ADMIN — Medication 0.5 MILLIGRAM(S): at 18:44

## 2024-10-29 RX ADMIN — Medication 25 MILLIGRAM(S): at 12:07

## 2024-10-29 RX ADMIN — Medication 0.5 MILLIGRAM(S): at 18:03

## 2024-10-29 RX ADMIN — POLYETHYLENE GLYCOL 3350 17 GRAM(S): 17 POWDER, FOR SOLUTION ORAL at 22:43

## 2024-10-29 RX ADMIN — KETOROLAC TROMETHAMINE 15 MILLIGRAM(S): 30 INJECTION INTRAMUSCULAR; INTRAVENOUS at 19:56

## 2024-10-29 RX ADMIN — Medication 2 TABLET(S): at 22:43

## 2024-10-29 RX ADMIN — FLUTICASONE PROPIONATE 1 SPRAY(S): 50 SPRAY, METERED NASAL at 22:44

## 2024-10-29 RX ADMIN — TRAMADOL HYDROCHLORIDE 50 MILLIGRAM(S): 50 TABLET, COATED ORAL at 11:21

## 2024-10-29 RX ADMIN — KETOROLAC TROMETHAMINE 15 MILLIGRAM(S): 30 INJECTION INTRAMUSCULAR; INTRAVENOUS at 20:11

## 2024-10-29 RX ADMIN — Medication 0.5 MILLIGRAM(S): at 17:47

## 2024-10-29 RX ADMIN — Medication 975 MILLIGRAM(S): at 11:22

## 2024-10-29 RX ADMIN — TRAMADOL HYDROCHLORIDE 50 MILLIGRAM(S): 50 TABLET, COATED ORAL at 22:43

## 2024-10-29 RX ADMIN — Medication 25 MILLIGRAM(S): at 22:43

## 2024-10-29 RX ADMIN — TRAMADOL HYDROCHLORIDE 50 MILLIGRAM(S): 50 TABLET, COATED ORAL at 22:42

## 2024-10-29 RX ADMIN — Medication 0.5 MILLIGRAM(S): at 20:11

## 2024-10-29 RX ADMIN — Medication 0.5 MILLIGRAM(S): at 19:13

## 2024-10-29 RX ADMIN — Medication 0.5 MILLIGRAM(S): at 19:56

## 2024-10-29 RX ADMIN — Medication 100 MILLIGRAM(S): at 11:29

## 2024-10-29 RX ADMIN — Medication 0.5 MILLIGRAM(S): at 19:17

## 2024-10-29 NOTE — PATIENT PROFILE ADULT - FALL HARM RISK - UNIVERSAL INTERVENTIONS
Bed in lowest position, wheels locked, appropriate side rails in place/Call bell, personal items and telephone in reach/Instruct patient to call for assistance before getting out of bed or chair/Non-slip footwear when patient is out of bed/[place] to call system/Physically safe environment - no spills, clutter or unnecessary equipment/Purposeful Proactive Rounding/Room/bathroom lighting operational, light cord in reach

## 2024-10-29 NOTE — PATIENT PROFILE ADULT - DOES PATIENT HAVE ADVANCE DIRECTIVE
Pt's brother Guille [phone] and son Edgar Altaf [phone] will make decisions in case of emergency/No

## 2024-10-29 NOTE — PATIENT PROFILE ADULT - PRO INTERPRETER NEED 2
Continue Regimen: Tripil lipid \\ngentle cleanser Initiate Treatment: Doxycycline 100mg 2xday \\nPhoto-corrective gel 2xday \\nSkin better 75 spf daily Detail Level: Generalized English

## 2024-10-30 DIAGNOSIS — Z96.652 PRESENCE OF LEFT ARTIFICIAL KNEE JOINT: ICD-10-CM

## 2024-10-30 DIAGNOSIS — G89.18 OTHER ACUTE POSTPROCEDURAL PAIN: ICD-10-CM

## 2024-10-30 LAB
ANION GAP SERPL CALC-SCNC: 7 MMOL/L — SIGNIFICANT CHANGE UP (ref 5–17)
BUN SERPL-MCNC: 10 MG/DL — SIGNIFICANT CHANGE UP (ref 7–18)
CALCIUM SERPL-MCNC: 8.5 MG/DL — SIGNIFICANT CHANGE UP (ref 8.4–10.5)
CHLORIDE SERPL-SCNC: 111 MMOL/L — HIGH (ref 96–108)
CO2 SERPL-SCNC: 24 MMOL/L — SIGNIFICANT CHANGE UP (ref 22–31)
CREAT SERPL-MCNC: 0.65 MG/DL — SIGNIFICANT CHANGE UP (ref 0.5–1.3)
EGFR: 102 ML/MIN/1.73M2 — SIGNIFICANT CHANGE UP
GLUCOSE SERPL-MCNC: 155 MG/DL — HIGH (ref 70–99)
HCT VFR BLD CALC: 30.3 % — LOW (ref 34.5–45)
HCT VFR BLD CALC: 31.1 % — LOW (ref 34.5–45)
HGB BLD-MCNC: 10.3 G/DL — LOW (ref 11.5–15.5)
HGB BLD-MCNC: 10.3 G/DL — LOW (ref 11.5–15.5)
MCHC RBC-ENTMCNC: 29.7 PG — SIGNIFICANT CHANGE UP (ref 27–34)
MCHC RBC-ENTMCNC: 30.7 PG — SIGNIFICANT CHANGE UP (ref 27–34)
MCHC RBC-ENTMCNC: 33.1 G/DL — SIGNIFICANT CHANGE UP (ref 32–36)
MCHC RBC-ENTMCNC: 34 G/DL — SIGNIFICANT CHANGE UP (ref 32–36)
MCV RBC AUTO: 89.6 FL — SIGNIFICANT CHANGE UP (ref 80–100)
MCV RBC AUTO: 90.2 FL — SIGNIFICANT CHANGE UP (ref 80–100)
NRBC # BLD: 0 /100 WBCS — SIGNIFICANT CHANGE UP (ref 0–0)
NRBC # BLD: 0 /100 WBCS — SIGNIFICANT CHANGE UP (ref 0–0)
PLATELET # BLD AUTO: 222 K/UL — SIGNIFICANT CHANGE UP (ref 150–400)
PLATELET # BLD AUTO: 227 K/UL — SIGNIFICANT CHANGE UP (ref 150–400)
POTASSIUM SERPL-MCNC: 4.3 MMOL/L — SIGNIFICANT CHANGE UP (ref 3.5–5.3)
POTASSIUM SERPL-SCNC: 4.3 MMOL/L — SIGNIFICANT CHANGE UP (ref 3.5–5.3)
RBC # BLD: 3.36 M/UL — LOW (ref 3.8–5.2)
RBC # BLD: 3.47 M/UL — LOW (ref 3.8–5.2)
RBC # FLD: 13.8 % — SIGNIFICANT CHANGE UP (ref 10.3–14.5)
RBC # FLD: 14.1 % — SIGNIFICANT CHANGE UP (ref 10.3–14.5)
SODIUM SERPL-SCNC: 142 MMOL/L — SIGNIFICANT CHANGE UP (ref 135–145)
WBC # BLD: 13.84 K/UL — HIGH (ref 3.8–10.5)
WBC # BLD: 14.64 K/UL — HIGH (ref 3.8–10.5)
WBC # FLD AUTO: 13.84 K/UL — HIGH (ref 3.8–10.5)
WBC # FLD AUTO: 14.64 K/UL — HIGH (ref 3.8–10.5)

## 2024-10-30 PROCEDURE — 99222 1ST HOSP IP/OBS MODERATE 55: CPT | Mod: FS

## 2024-10-30 RX ORDER — ACETAMINOPHEN 500 MG
1000 TABLET ORAL ONCE
Refills: 0 | Status: DISCONTINUED | OUTPATIENT
Start: 2024-10-30 | End: 2024-10-31

## 2024-10-30 RX ORDER — HYDROMORPHONE HCL/0.9% NACL/PF 6 MG/30 ML
0.5 PATIENT CONTROLLED ANALGESIA SYRINGE INTRAVENOUS ONCE
Refills: 0 | Status: DISCONTINUED | OUTPATIENT
Start: 2024-10-30 | End: 2024-10-30

## 2024-10-30 RX ORDER — KETOROLAC TROMETHAMINE 30 MG/ML
30 INJECTION INTRAMUSCULAR; INTRAVENOUS EVERY 6 HOURS
Refills: 0 | Status: DISCONTINUED | OUTPATIENT
Start: 2024-10-30 | End: 2024-11-01

## 2024-10-30 RX ORDER — HYDROMORPHONE HCL/0.9% NACL/PF 6 MG/30 ML
2 PATIENT CONTROLLED ANALGESIA SYRINGE INTRAVENOUS EVERY 6 HOURS
Refills: 0 | Status: DISCONTINUED | OUTPATIENT
Start: 2024-10-30 | End: 2024-10-31

## 2024-10-30 RX ADMIN — Medication 1000 MILLIGRAM(S): at 05:33

## 2024-10-30 RX ADMIN — Medication 1 MILLIGRAM(S): at 21:33

## 2024-10-30 RX ADMIN — Medication 1000 MILLIGRAM(S): at 13:57

## 2024-10-30 RX ADMIN — OXYCODONE HYDROCHLORIDE 5 MILLIGRAM(S): 30 TABLET ORAL at 09:42

## 2024-10-30 RX ADMIN — KETOROLAC TROMETHAMINE 30 MILLIGRAM(S): 30 INJECTION INTRAMUSCULAR; INTRAVENOUS at 17:06

## 2024-10-30 RX ADMIN — Medication 2 MILLIGRAM(S): at 23:52

## 2024-10-30 RX ADMIN — Medication 30 MILLIGRAM(S): at 16:22

## 2024-10-30 RX ADMIN — FLUTICASONE PROPIONATE AND SALMETEROL XINAFOATE 1 DOSE(S): 230; 21 AEROSOL, METERED RESPIRATORY (INHALATION) at 21:34

## 2024-10-30 RX ADMIN — Medication 300 MILLIGRAM(S): at 12:56

## 2024-10-30 RX ADMIN — POLYETHYLENE GLYCOL 3350 17 GRAM(S): 17 POWDER, FOR SOLUTION ORAL at 21:32

## 2024-10-30 RX ADMIN — FLUTICASONE PROPIONATE 1 SPRAY(S): 50 SPRAY, METERED NASAL at 09:46

## 2024-10-30 RX ADMIN — KETOROLAC TROMETHAMINE 30 MILLIGRAM(S): 30 INJECTION INTRAMUSCULAR; INTRAVENOUS at 01:00

## 2024-10-30 RX ADMIN — TRAMADOL HYDROCHLORIDE 50 MILLIGRAM(S): 50 TABLET, COATED ORAL at 21:33

## 2024-10-30 RX ADMIN — TRAMADOL HYDROCHLORIDE 50 MILLIGRAM(S): 50 TABLET, COATED ORAL at 05:33

## 2024-10-30 RX ADMIN — Medication 100 MILLIGRAM(S): at 00:38

## 2024-10-30 RX ADMIN — TRAMADOL HYDROCHLORIDE 50 MILLIGRAM(S): 50 TABLET, COATED ORAL at 22:33

## 2024-10-30 RX ADMIN — MONTELUKAST SODIUM 10 MILLIGRAM(S): 10 TABLET, FILM COATED ORAL at 12:57

## 2024-10-30 RX ADMIN — Medication 30 MILLIGRAM(S): at 03:52

## 2024-10-30 RX ADMIN — BUSPIRONE HYDROCHLORIDE 10 MILLIGRAM(S): 15 TABLET ORAL at 05:33

## 2024-10-30 RX ADMIN — FLUTICASONE PROPIONATE AND SALMETEROL XINAFOATE 1 DOSE(S): 230; 21 AEROSOL, METERED RESPIRATORY (INHALATION) at 12:59

## 2024-10-30 RX ADMIN — KETOROLAC TROMETHAMINE 30 MILLIGRAM(S): 30 INJECTION INTRAMUSCULAR; INTRAVENOUS at 09:47

## 2024-10-30 RX ADMIN — TRAMADOL HYDROCHLORIDE 50 MILLIGRAM(S): 50 TABLET, COATED ORAL at 14:03

## 2024-10-30 RX ADMIN — Medication 500 MILLIGRAM(S): at 12:57

## 2024-10-30 RX ADMIN — Medication 20 MILLIGRAM(S): at 21:33

## 2024-10-30 RX ADMIN — OXYCODONE HYDROCHLORIDE 5 MILLIGRAM(S): 30 TABLET ORAL at 08:42

## 2024-10-30 RX ADMIN — KETOROLAC TROMETHAMINE 30 MILLIGRAM(S): 30 INJECTION INTRAMUSCULAR; INTRAVENOUS at 20:06

## 2024-10-30 RX ADMIN — Medication 2 TABLET(S): at 21:34

## 2024-10-30 RX ADMIN — Medication 25 MILLIGRAM(S): at 21:33

## 2024-10-30 RX ADMIN — Medication 4 GRAM(S): at 08:42

## 2024-10-30 RX ADMIN — Medication 1000 MILLIGRAM(S): at 17:46

## 2024-10-30 RX ADMIN — Medication 2 MILLIGRAM(S): at 23:11

## 2024-10-30 RX ADMIN — TRAMADOL HYDROCHLORIDE 50 MILLIGRAM(S): 50 TABLET, COATED ORAL at 13:03

## 2024-10-30 RX ADMIN — Medication 1000 MILLIGRAM(S): at 18:16

## 2024-10-30 RX ADMIN — Medication 0.5 MILLIGRAM(S): at 16:43

## 2024-10-30 RX ADMIN — Medication 0.5 MILLIGRAM(S): at 16:13

## 2024-10-30 RX ADMIN — BUSPIRONE HYDROCHLORIDE 10 MILLIGRAM(S): 15 TABLET ORAL at 17:46

## 2024-10-30 RX ADMIN — Medication 1000 MILLIGRAM(S): at 12:57

## 2024-10-30 RX ADMIN — Medication 81 MILLIGRAM(S): at 12:57

## 2024-10-30 RX ADMIN — Medication 25 MILLIGRAM(S): at 13:03

## 2024-10-30 RX ADMIN — KETOROLAC TROMETHAMINE 30 MILLIGRAM(S): 30 INJECTION INTRAMUSCULAR; INTRAVENOUS at 21:06

## 2024-10-30 RX ADMIN — KETOROLAC TROMETHAMINE 30 MILLIGRAM(S): 30 INJECTION INTRAMUSCULAR; INTRAVENOUS at 00:25

## 2024-10-30 RX ADMIN — FAMOTIDINE 40 MILLIGRAM(S): 10 INJECTION INTRAVENOUS at 12:56

## 2024-10-30 RX ADMIN — Medication 325 MILLIGRAM(S): at 12:57

## 2024-10-30 RX ADMIN — Medication 25 MILLIGRAM(S): at 05:33

## 2024-10-30 NOTE — CONSULT NOTE ADULT - PROBLEM SELECTOR RECOMMENDATION 9
Pt with acute left knee pain which is somatic and neuropathic in nature due to primary OA. Patient is status post Left Total Knee Replacement wit Dr. Sullivan on 10/29 now POD 1. Patient has a history of chronic pain due to fibromyalgia and psoriatic arthritis. She has a spinal stimulator in place and see' s Dr. Bello for outpatient pain management. To note, patient takes curaleaf CBD oil for chronic pain.   Opioid pain recommendations   - Continue Tramadol 50mg PO q 8 hours. Monitor for sedation/ respiratory depression.   - Continue Oxycodone 5 mg PO q 4 hours PRN moderate pain. Monitor for sedation/ respiratory depression.   Non-opioid pain recommendations   - Continue Toradol 30 mg IVP q 6 hours PRN severe pain  - Continue Acetaminophen 1 gram PO q 6 hours for 24 hours only. Monitor LFTs  - Continue Celebrex 200mg PO daily  Bowel Regimen  - Continue Miralax 17G PO daily  - Continue Senna 2 tablets at bedtime for constipation  Mild pain   - Non-pharmacological pain treatment recommendations  - Warm/ Cool packs PRN   - Repositioning extremity, elevation, imagery, relaxation, distraction.  - Physical therapy OOB if no contraindications   Recommendations discussed with primary team and RN.  Upon discharge – dc on Celebrex 200mg po daily and Percocet 5/325mg po q 6 hours prn for 1 week. Pt to take OTC stool softeners

## 2024-10-30 NOTE — CONSULT NOTE ADULT - SUBJECTIVE AND OBJECTIVE BOX
Source of information: KEVEN COLIN, Chart review  Patient language: English  : n/a    HPI:  This is a 59 yr old  female with PMH of  mild CAD-on aspirin, HLD, fibromyalgia, psoriatic arthritis, allergic rhinitis, GERD, migraine headache associated with vertigo and nausea, anxiety, depression, PTSD, RA, asthma, PSH of cervical spine fusion, lumbar fusion, low back pain -s/p spinal cord stimulator insertion on 8/13/20, who presents with c/o left knee pain due to osteoarthritis. Pt reports worsening of pain with prolonged ambulation and standing. Pt is scheduled for left total knee replacement on 10/29/24.  (23 Oct 2024 07:33)    Pt is admitted for scheduled left total knee replacement with Dr. Sullivan on 10/29 now POD 1. Pain service consulted for acute postoperative pain. Pt seen and examined at bedside, this morning. At time of assessment, patient laying in bed reports left knee pain score 8/10 SCALE USED: (1-10 VNRS). Primary RN notified to administer PRN  pain medication as ordered. Per patient, she has a history of chronic pain due to fibromyalgia and psoriatic arthritis. She has a spinal stimulator in place and see' s Dr. Bello for outpatient pain management. Pt describes pain as localized to the left knee joint, constant and throbbing in quality. The pain is alleviated by pain medication and is exacerbated by movement. Pt tolerating PO diet. Denies lethargy, chest pain, SOB, nausea, vomiting, constipation. Reports last BM prior to surgery. Patient stated goal for pain control: to be able to take deep breaths, get out of bed to chair and ambulate with tolerable pain control. To note, patient takes curaleaf CBD oil for chronic pain.     PAST MEDICAL & SURGICAL HISTORY:  Asthma    Anxiety    Depression    Constipation    Rheumatoid arthritis    Heartburn    Psoriatic arthritis    Primary osteoarthritis of right knee    Migraine headache    Fibromyalgia    GERD (gastroesophageal reflux disease)    CAD (coronary artery disease)    Allergic rhinitis    S/P right knee arthroscopy  x 3    H/O abdominal hysterectomy    H/O tubal ligation    History of arthroscopy of left shoulder    DJD (degenerative joint disease) of cervical spine  Plate and screws and 2 artificial discs.  Lumbar spine danilo and screws    Ankle fracture  right ankle arthroscopy    History of total knee replacement, right    S/P insertion of spinal cord stimulator    History of laparoscopic cholecystectomy      FAMILY HISTORY:  Family history of stroke (Father)    Family history of rheumatoid arthritis (Father)  mother and grandmother who passed away    Family history of ovarian cancer (Mother)    Family history of heart disease (Father)    Family history of hypertension (Father, Mother)    Family history of diabetes mellitus (Mother)        Social History:   [ ] Denies ETOH use, illicit drug use and smoking    Allergies    clindamycin (Hives; Rash)  Lamictal (Hives)  latex (Rash)  vancomycin (Hives)  erythromycin (Hives)  penicillins (Hives)  penicillin (Unknown)  Bactrim (Unknown)  Levaquin (Hives)  Bactrim DS (Hives)  Flagyl (Hives; Diarrhea)  azithromycin (Hives)    Intolerances    MEDICATIONS  (STANDING):  acetaminophen     Tablet .. 1000 milliGRAM(s) Oral every 6 hours  ascorbic acid 500 milliGRAM(s) Oral daily  aspirin enteric coated 81 milliGRAM(s) Oral daily  atorvastatin 20 milliGRAM(s) Oral at bedtime  buPROPion XL (24-Hour) . 300 milliGRAM(s) Oral daily  busPIRone 10 milliGRAM(s) Oral every 12 hours  cholestyramine Powder (Sugar-Free) 4 Gram(s) Oral daily  doxazosin 1 milliGRAM(s) Oral at bedtime  enoxaparin Injectable 30 milliGRAM(s) SubCutaneous every 12 hours  famotidine    Tablet 40 milliGRAM(s) Oral daily  ferrous    sulfate 325 milliGRAM(s) Oral daily  fluticasone propionate/ salmeterol 500-50 MICROgram(s) Diskus 1 Dose(s) Inhalation two times a day  meclizine 25 milliGRAM(s) Oral three times a day  montelukast 10 milliGRAM(s) Oral daily  polyethylene glycol 3350 17 Gram(s) Oral at bedtime  senna 2 Tablet(s) Oral at bedtime  sodium chloride 0.9%. 1000 milliLiter(s) (110 mL/Hr) IV Continuous <Continuous>  traMADol 50 milliGRAM(s) Oral every 8 hours    MEDICATIONS  (PRN):  acetaminophen 300 mG/butalbital 50 mG/ caffeine 40 mG 1 Capsule(s) Oral every 6 hours PRN migraine headache  albuterol    90 MICROgram(s) HFA Inhaler 2 Puff(s) Inhalation every 6 hours PRN for shortness of breath and/or wheezing  fluticasone propionate 50 MICROgram(s)/spray Nasal Spray 1 Spray(s) Both Nostrils two times a day PRN Allergies  ketorolac   Injectable 30 milliGRAM(s) IV Push every 6 hours PRN Severe Pain (7 - 10)  magnesium hydroxide Suspension 30 milliLiter(s) Oral daily PRN Constipation  ondansetron Injectable 4 milliGRAM(s) IV Push every 6 hours PRN Nausea and/or Vomiting  oxyCODONE    IR 5 milliGRAM(s) Oral every 4 hours PRN Moderate Pain (4 - 6)    Vital Signs Last 24 Hrs  T(C): 36.6 (30 Oct 2024 05:14), Max: 36.9 (29 Oct 2024 11:32)  T(F): 97.9 (30 Oct 2024 05:14), Max: 98.4 (29 Oct 2024 11:32)  HR: 84 (30 Oct 2024 05:14) (79 - 98)  BP: 94/60 (30 Oct 2024 05:14) (94/60 - 131/79)  BP(mean): 71 (30 Oct 2024 05:14) (71 - 94)  RR: 17 (30 Oct 2024 05:14) (11 - 29)  SpO2: 95% (30 Oct 2024 05:14) (91% - 100%)    Parameters below as of 30 Oct 2024 05:14  Patient On (Oxygen Delivery Method): room air    LABS: Reviewed.                          10.3   14.64 )-----------( 227      ( 30 Oct 2024 07:05 )             30.3     10-30    142  |  111[H]  |  10  ----------------------------<  155[H]  4.3   |  24  |  0.65    Ca    8.5      30 Oct 2024 07:05    Urinalysis Basic - ( 30 Oct 2024 07:05 )    Color: x / Appearance: x / SG: x / pH: x  Gluc: 155 mg/dL / Ketone: x  / Bili: x / Urobili: x   Blood: x / Protein: x / Nitrite: x   Leuk Esterase: x / RBC: x / WBC x   Sq Epi: x / Non Sq Epi: x / Bacteria: x    CAPILLARY BLOOD GLUCOSE    Radiology: None to be Reviewed.     ORT Score -   Family Hx of substance abuse	Female	      Male  Alcohol 	                                           1                     3  Illegal drugs	                                   2                     3  Rx drugs                                           4 	                  4  Personal Hx of substance abuse		  Alcohol 	                                          3	                  3  Illegal drugs                                     4	                  4  Rx drugs                                            5 	                  5  Age between 16- 45 years	           1                     1  hx preadolescent sexual abuse	   3 	                  0  Psychological disease		  ADD, OCD, bipolar, schizophrenia   2	          2  Depression                                           1 	          1  Total: 1    a score of 3 or lower indicates low risk for opioid abuse		  a score of 4-7 indicates moderate risk for opioid abuse		  a score of 8 or higher indicates high risk for opioid abuse  	  REVIEW OF SYSTEMS:  CONSTITUTIONAL: No fever or fatigue  HEENT:  No difficulty hearing, no change in vision  NECK: No pain or stiffness  RESPIRATORY: No cough, wheezing, chills or hemoptysis; No shortness of breath  CARDIOVASCULAR: No chest pain, palpitations, dizziness, or leg swelling  GASTROINTESTINAL: No loss of appetite, decreased PO intake. No abdominal or epigastric pain. No nausea, vomiting; No diarrhea or constipation.   GENITOURINARY: No dysuria, frequency, hematuria, retention or incontinence  MUSCULOSKELETAL: + left knee pain; + history chronic back and joint pain, no upper or lower motor strength weakness, no saddle anesthesia, bowel/bladder incontinence, no falls   NEURO: No headaches, No numbness/tingling b/l LE  ENDOCRINE: No polyuria, polydipsia, heat or cold intolerance; No hair loss  PSYCHIATRIC: Hx of anxiety and depression     PHYSICAL EXAM:  GENERAL:  Alert & Oriented X4, cooperative, NAD, Good concentration. Speech is clear.   RESPIRATORY: Respirations even and unlabored. Clear to auscultation bilaterally  CARDIOVASCULAR: Normal S1/S2, regular rate and rhythm  GASTROINTESTINAL:  Soft, Nontender, Nondistended; Bowel sounds present  PERIPHERAL VASCULAR:  Extremities warm. 2+ Peripheral Pulses, No cyanosis, No calf tenderness  MUSCULOSKELETAL: Motor Strength 5/5 B/L upper and lower extremities; moves all extremities equally against gravity; decreased LLE ROM due to pain; negative SLR; + left tenderness on palpation   SKIN: Warm, dry, left knee ace wrap C/D/I    Risk factors associated with adverse outcomes related to opioid treatment  [ ] Concurrent benzodiazepine use  [ ] History/ Active substance use or alcohol use disorder  [x] Psychiatric co-morbidity  [ ] Sleep apnea  [ ] COPD  [ ] BMI> 35  [ ] Liver dysfunction  [ ] Renal dysfunction  [ ] CHF  [ ] Smoker  [ ] Age > 60 years    [x  [place]  Reviewed and Copied to Chart. See below.    Plan of care and goal oriented pain management treatment options were discussed with patient and /or primary care giver; all questions and concerns were addressed and care was aligned with patient's wishes.    Educated patient on goal oriented pain management treatment options

## 2024-10-30 NOTE — PHYSICAL THERAPY INITIAL EVALUATION ADULT - RANGE OF MOTION EXAMINATION, REHAB EVAL
LLE limited in A/PROM/bilateral upper extremity ROM was WNL (within normal limits)/Right LE ROM was WNL (within normal limits)

## 2024-10-30 NOTE — CONSULT NOTE ADULT - PROBLEM SELECTOR RECOMMENDATION 2
**** Addendum @ 15:18. Called by ortho service that patient is reporting 10/10 severe pain to left knee incision site. Upon reassessment patient is adamant about not taking oxycodone PRN for pain control. Patient and provider engaged in shared decision making and agreed upon Dilaudid 2mg PO q 6hrs PRN for severe pain instead of oxycodone. Patient also ordered for Dilaudid 0.5mg IVP once STAT for breakthrough pain. Patient educated that Dilaudid is not recommended for pain control upon discharge, patient verbalized understanding. Orthopedic service notified and in agreement with plan. ****    New Pain Regimen    - Dilaudid 0.5mg IVP once STAT for breakthrough pain   - Toradol 30mg IVP q 6hrs PRN for moderate pain  - Dilaudid 2mg PO q 6hrs PRN for severe pain

## 2024-10-30 NOTE — PHYSICAL THERAPY INITIAL EVALUATION ADULT - DIAGNOSIS, PT EVAL
Pt. is  60 y/o F s/p L knee arthroplasty presented with increased pain around her L knee, diminished pain and light touch sensation, and decreased LLE A/PROM. The pt is limited in her ability to perform bed mobility, transfers, ambulate and negotiate stairs independent, having to utilize a RW AD. Her current condition and extensive pmh has severely limited her functional mobility, functional capacity and QOL.

## 2024-10-30 NOTE — PHYSICAL THERAPY INITIAL EVALUATION ADULT - GAIT DEVIATIONS NOTED, PT EVAL
decreased krystina/increased time in double stance/decreased velocity of limb motion/decreased step length/decreased stride length/decreased weight-shifting ability

## 2024-10-30 NOTE — PHYSICAL THERAPY INITIAL EVALUATION ADULT - IMPAIRMENTS FOUND, PT EVAL
aerobic capacity/endurance/gait, locomotion, and balance/muscle strength/posture/ROM/sensory integrity

## 2024-10-30 NOTE — CONSULT NOTE ADULT - ASSESSMENT
Search Terms: Elizabeth Harvey, 1965Search Date: 10/30/2024 08:53:48 AM  The Drug Utilization Report below displays all of the controlled substance prescriptions, if any, that your patient has filled in the last twelve months. The information displayed on this report is compiled from pharmacy submissions to the Department, and accurately reflects the information as submitted by the pharmacies.    This report was requested by: Nicolasa Whitehead | Reference #: [id number]    Practitioner Count: 2  Pharmacy Count: 2  Current Opioid Prescriptions: 1  Current Benzodiazepine Prescriptions: 0  Current Stimulant Prescriptions: 0      Patient Demographic Information (PDI)       PDI	First Name	Last Name	Birth Date	Gender	Street Address	City	State	Zip Code  A	Elizabeth Harvey	1965	Female	[address]	[place] [facility]	[address]  B	Elizabeth Harvey	1965	Female	[address]	[place]	[address]  C	Elizabeth Harvey	1965	Female	[address] 	[place]	[address]    Prescription Information      PDI Filter:    PDI	My Rx	Current Rx	Drug Type	Rx Written	Rx Dispensed	Drug	Quantity	Days Supply	Prescriber Name	Prescriber PATRICIA #	Payment Method	Dispenser  A	N	Y	O	10/22/2024	10/25/2024	oxycodone-acetaminophen 7.5-325 mg tablet	28	7	Vitor Sullivan MD	[id number]	Insurance	[facility]  A	N	N	O	07/15/2024	07/15/2024	tramadol hcl 50 mg tablet	28	9	Vitor Sullivan MD	[id number]	Insurance	[facility]  A	N	N	O	07/02/2024	07/05/2024	oxycodone-acetaminophen 7.5-325 mg tablet	28	7	Vitor Sullivan MD	[id number]	Insurance	[facility]  B	N	N	O	09/05/2024	09/05/2024	tramadol hcl 50 mg tablet	90	30	Abraham Alamo	[id number]	Insurance	[facility] [id number]  C	N	N		05/31/2023	02/06/2024	curaleaf indica 75% (20:1) 2.5mg thc / 0.125mg cbd/dose vape	1	2	Koko Vines DO	[id number]	[facility]  C	N	N		05/31/2023	02/06/2024	curaleaf indica 75% (20:1) 2.5mg thc / 0.125mg cbd/dose vape	1	2	Koko Vines DO	[id number]	[place]	[facility] [place]  C	N	N		05/31/2023	12/18/2023	curaleaf indica 75% (20:1) 2.5mg thc / 0.125mg cbd/dose vape	1	2	Koko Vines DO	[id number]	[place]	[facility] [place]  C	N	N		05/31/2023	12/18/2023	curaleaf indica 75% (20:1) 2.5mg thc / 0.125mg cbd/dose vape	2	4	Koko Vines DO	[id number]	[place]	[facility] [place]  C	N	N		05/31/2023	12/18/2023	curaleaf indica 75% (20:1) 2.5mg thc / 0.125mg cbd/dose vape	3	6	Koko Vines DO	[id number]	[place]	[facility]    * - Details of Drug Type : O = Opioid, B = Benzodiazepine, S = Stimulant

## 2024-10-30 NOTE — PHYSICAL THERAPY INITIAL EVALUATION ADULT - PERTINENT HX OF CURRENT PROBLEM, REHAB EVAL
Pt. is  58 y/o F s/p L knee arthroplasty expressing complaints of L Leg pain (10/10), weakness, and lack of sensation from the knee down. Pt has an extensive orthopedic surgical hx and medical history, most notably her fibromyalgia and RA, OA and spinal stimulator for pain. Pt reports living alone in, but is considering staying with her son, however he has stairs that she feels she cannot do and wants to go to [facility]. Pt reports pain in her L hip, L shoulder, LB and neck due to her expensive pmh. Prior level of function was limited to her wide spread pain, reporting exacerbation of symptoms during exercise and increased difficulty with stair negotiation. Pt was not utilizing an AD prior, but has used one in the past for previous surgeries. Pt. reports not being physically active throughout her life, but tries to go for walks and exercise, but it exacerbates her pain.

## 2024-10-30 NOTE — PHYSICAL THERAPY INITIAL EVALUATION ADULT - GENERAL OBSERVATIONS, REHAB EVAL
Pt. received lying in bed, A&Ox3, calm, bandages intact, L knee bent, L leg slight discoloration (red hue).

## 2024-10-31 LAB
ANION GAP SERPL CALC-SCNC: 4 MMOL/L — LOW (ref 5–17)
BUN SERPL-MCNC: 8 MG/DL — SIGNIFICANT CHANGE UP (ref 7–18)
CALCIUM SERPL-MCNC: 8.4 MG/DL — SIGNIFICANT CHANGE UP (ref 8.4–10.5)
CHLORIDE SERPL-SCNC: 109 MMOL/L — HIGH (ref 96–108)
CO2 SERPL-SCNC: 28 MMOL/L — SIGNIFICANT CHANGE UP (ref 22–31)
CREAT SERPL-MCNC: 0.63 MG/DL — SIGNIFICANT CHANGE UP (ref 0.5–1.3)
EGFR: 103 ML/MIN/1.73M2 — SIGNIFICANT CHANGE UP
GLUCOSE SERPL-MCNC: 115 MG/DL — HIGH (ref 70–99)
HCT VFR BLD CALC: 29.7 % — LOW (ref 34.5–45)
HGB BLD-MCNC: 10 G/DL — LOW (ref 11.5–15.5)
MCHC RBC-ENTMCNC: 30.2 PG — SIGNIFICANT CHANGE UP (ref 27–34)
MCHC RBC-ENTMCNC: 33.7 G/DL — SIGNIFICANT CHANGE UP (ref 32–36)
MCV RBC AUTO: 89.7 FL — SIGNIFICANT CHANGE UP (ref 80–100)
NRBC # BLD: 0 /100 WBCS — SIGNIFICANT CHANGE UP (ref 0–0)
PLATELET # BLD AUTO: 214 K/UL — SIGNIFICANT CHANGE UP (ref 150–400)
POTASSIUM SERPL-MCNC: 4.3 MMOL/L — SIGNIFICANT CHANGE UP (ref 3.5–5.3)
POTASSIUM SERPL-SCNC: 4.3 MMOL/L — SIGNIFICANT CHANGE UP (ref 3.5–5.3)
RBC # BLD: 3.31 M/UL — LOW (ref 3.8–5.2)
RBC # FLD: 14.3 % — SIGNIFICANT CHANGE UP (ref 10.3–14.5)
SODIUM SERPL-SCNC: 141 MMOL/L — SIGNIFICANT CHANGE UP (ref 135–145)
WBC # BLD: 12.75 K/UL — HIGH (ref 3.8–10.5)
WBC # FLD AUTO: 12.75 K/UL — HIGH (ref 3.8–10.5)

## 2024-10-31 PROCEDURE — 99232 SBSQ HOSP IP/OBS MODERATE 35: CPT

## 2024-10-31 RX ORDER — HYDROMORPHONE HCL/0.9% NACL/PF 6 MG/30 ML
2 PATIENT CONTROLLED ANALGESIA SYRINGE INTRAVENOUS EVERY 4 HOURS
Refills: 0 | Status: DISCONTINUED | OUTPATIENT
Start: 2024-10-31 | End: 2024-11-01

## 2024-10-31 RX ORDER — ACETAMINOPHEN 500 MG
1000 TABLET ORAL EVERY 6 HOURS
Refills: 0 | Status: COMPLETED | OUTPATIENT
Start: 2024-10-31 | End: 2024-11-01

## 2024-10-31 RX ADMIN — Medication 30 MILLIGRAM(S): at 04:58

## 2024-10-31 RX ADMIN — KETOROLAC TROMETHAMINE 30 MILLIGRAM(S): 30 INJECTION INTRAMUSCULAR; INTRAVENOUS at 10:52

## 2024-10-31 RX ADMIN — KETOROLAC TROMETHAMINE 30 MILLIGRAM(S): 30 INJECTION INTRAMUSCULAR; INTRAVENOUS at 01:53

## 2024-10-31 RX ADMIN — BUSPIRONE HYDROCHLORIDE 10 MILLIGRAM(S): 15 TABLET ORAL at 05:02

## 2024-10-31 RX ADMIN — POLYETHYLENE GLYCOL 3350 17 GRAM(S): 17 POWDER, FOR SOLUTION ORAL at 21:07

## 2024-10-31 RX ADMIN — Medication 4 GRAM(S): at 09:30

## 2024-10-31 RX ADMIN — TRAMADOL HYDROCHLORIDE 50 MILLIGRAM(S): 50 TABLET, COATED ORAL at 22:07

## 2024-10-31 RX ADMIN — Medication 30 MILLIGRAM(S): at 15:00

## 2024-10-31 RX ADMIN — FLUTICASONE PROPIONATE AND SALMETEROL XINAFOATE 1 DOSE(S): 230; 21 AEROSOL, METERED RESPIRATORY (INHALATION) at 21:07

## 2024-10-31 RX ADMIN — Medication 500 MILLIGRAM(S): at 11:41

## 2024-10-31 RX ADMIN — Medication 25 MILLIGRAM(S): at 05:02

## 2024-10-31 RX ADMIN — TRAMADOL HYDROCHLORIDE 50 MILLIGRAM(S): 50 TABLET, COATED ORAL at 06:02

## 2024-10-31 RX ADMIN — MONTELUKAST SODIUM 10 MILLIGRAM(S): 10 TABLET, FILM COATED ORAL at 11:42

## 2024-10-31 RX ADMIN — KETOROLAC TROMETHAMINE 30 MILLIGRAM(S): 30 INJECTION INTRAMUSCULAR; INTRAVENOUS at 17:24

## 2024-10-31 RX ADMIN — TRAMADOL HYDROCHLORIDE 50 MILLIGRAM(S): 50 TABLET, COATED ORAL at 05:02

## 2024-10-31 RX ADMIN — Medication 1 MILLIGRAM(S): at 21:06

## 2024-10-31 RX ADMIN — Medication 2 MILLIGRAM(S): at 21:07

## 2024-10-31 RX ADMIN — Medication 2 MILLIGRAM(S): at 15:50

## 2024-10-31 RX ADMIN — Medication 81 MILLIGRAM(S): at 11:42

## 2024-10-31 RX ADMIN — Medication 5 MILLIGRAM(S): at 11:41

## 2024-10-31 RX ADMIN — KETOROLAC TROMETHAMINE 30 MILLIGRAM(S): 30 INJECTION INTRAMUSCULAR; INTRAVENOUS at 02:53

## 2024-10-31 RX ADMIN — Medication 1000 MILLIGRAM(S): at 23:06

## 2024-10-31 RX ADMIN — Medication 1000 MILLIGRAM(S): at 12:40

## 2024-10-31 RX ADMIN — Medication 2 MILLIGRAM(S): at 08:06

## 2024-10-31 RX ADMIN — KETOROLAC TROMETHAMINE 30 MILLIGRAM(S): 30 INJECTION INTRAMUSCULAR; INTRAVENOUS at 11:52

## 2024-10-31 RX ADMIN — Medication 2 TABLET(S): at 21:06

## 2024-10-31 RX ADMIN — Medication 2 MILLIGRAM(S): at 09:06

## 2024-10-31 RX ADMIN — BUSPIRONE HYDROCHLORIDE 10 MILLIGRAM(S): 15 TABLET ORAL at 17:21

## 2024-10-31 RX ADMIN — Medication 325 MILLIGRAM(S): at 11:41

## 2024-10-31 RX ADMIN — Medication 1000 MILLIGRAM(S): at 18:06

## 2024-10-31 RX ADMIN — Medication 1000 MILLIGRAM(S): at 17:21

## 2024-10-31 RX ADMIN — Medication 2 MILLIGRAM(S): at 20:07

## 2024-10-31 RX ADMIN — Medication 25 MILLIGRAM(S): at 21:06

## 2024-10-31 RX ADMIN — FAMOTIDINE 40 MILLIGRAM(S): 10 INJECTION INTRAVENOUS at 11:42

## 2024-10-31 RX ADMIN — Medication 1000 MILLIGRAM(S): at 11:41

## 2024-10-31 RX ADMIN — FLUTICASONE PROPIONATE AND SALMETEROL XINAFOATE 1 DOSE(S): 230; 21 AEROSOL, METERED RESPIRATORY (INHALATION) at 11:51

## 2024-10-31 RX ADMIN — KETOROLAC TROMETHAMINE 30 MILLIGRAM(S): 30 INJECTION INTRAMUSCULAR; INTRAVENOUS at 18:05

## 2024-10-31 RX ADMIN — Medication 300 MILLIGRAM(S): at 11:41

## 2024-10-31 RX ADMIN — Medication 2 MILLIGRAM(S): at 14:59

## 2024-10-31 RX ADMIN — Medication 25 MILLIGRAM(S): at 15:00

## 2024-10-31 RX ADMIN — TRAMADOL HYDROCHLORIDE 50 MILLIGRAM(S): 50 TABLET, COATED ORAL at 21:07

## 2024-10-31 RX ADMIN — Medication 20 MILLIGRAM(S): at 21:07

## 2024-11-01 ENCOUNTER — TRANSCRIPTION ENCOUNTER (OUTPATIENT)
Age: 59
End: 2024-11-01

## 2024-11-01 VITALS
RESPIRATION RATE: 16 BRPM | OXYGEN SATURATION: 97 % | DIASTOLIC BLOOD PRESSURE: 76 MMHG | TEMPERATURE: 98 F | SYSTOLIC BLOOD PRESSURE: 117 MMHG | HEART RATE: 82 BPM

## 2024-11-01 LAB
ANION GAP SERPL CALC-SCNC: 6 MMOL/L — SIGNIFICANT CHANGE UP (ref 5–17)
BUN SERPL-MCNC: 6 MG/DL — LOW (ref 7–18)
CALCIUM SERPL-MCNC: 8.5 MG/DL — SIGNIFICANT CHANGE UP (ref 8.4–10.5)
CHLORIDE SERPL-SCNC: 108 MMOL/L — SIGNIFICANT CHANGE UP (ref 96–108)
CO2 SERPL-SCNC: 28 MMOL/L — SIGNIFICANT CHANGE UP (ref 22–31)
CREAT SERPL-MCNC: 0.66 MG/DL — SIGNIFICANT CHANGE UP (ref 0.5–1.3)
EGFR: 102 ML/MIN/1.73M2 — SIGNIFICANT CHANGE UP
GLUCOSE SERPL-MCNC: 121 MG/DL — HIGH (ref 70–99)
HCT VFR BLD CALC: 29.2 % — LOW (ref 34.5–45)
HGB BLD-MCNC: 9.7 G/DL — LOW (ref 11.5–15.5)
MCHC RBC-ENTMCNC: 30.6 PG — SIGNIFICANT CHANGE UP (ref 27–34)
MCHC RBC-ENTMCNC: 33.2 G/DL — SIGNIFICANT CHANGE UP (ref 32–36)
MCV RBC AUTO: 92.1 FL — SIGNIFICANT CHANGE UP (ref 80–100)
NRBC # BLD: 0 /100 WBCS — SIGNIFICANT CHANGE UP (ref 0–0)
PLATELET # BLD AUTO: 197 K/UL — SIGNIFICANT CHANGE UP (ref 150–400)
POTASSIUM SERPL-MCNC: 3.9 MMOL/L — SIGNIFICANT CHANGE UP (ref 3.5–5.3)
POTASSIUM SERPL-SCNC: 3.9 MMOL/L — SIGNIFICANT CHANGE UP (ref 3.5–5.3)
RBC # BLD: 3.17 M/UL — LOW (ref 3.8–5.2)
RBC # FLD: 14.2 % — SIGNIFICANT CHANGE UP (ref 10.3–14.5)
SODIUM SERPL-SCNC: 142 MMOL/L — SIGNIFICANT CHANGE UP (ref 135–145)
WBC # BLD: 10.41 K/UL — SIGNIFICANT CHANGE UP (ref 3.8–10.5)
WBC # FLD AUTO: 10.41 K/UL — SIGNIFICANT CHANGE UP (ref 3.8–10.5)

## 2024-11-01 PROCEDURE — 99232 SBSQ HOSP IP/OBS MODERATE 35: CPT | Mod: FS

## 2024-11-01 RX ORDER — ENOXAPARIN SODIUM 40MG/0.4ML
40 SYRINGE (ML) SUBCUTANEOUS
Qty: 0 | Refills: 0 | DISCHARGE

## 2024-11-01 RX ORDER — POLYETHYLENE GLYCOL 3350 17 G/17G
17 POWDER, FOR SOLUTION ORAL
Qty: 0 | Refills: 0 | DISCHARGE
Start: 2024-11-01

## 2024-11-01 RX ORDER — FERROUS SULFATE 325(65) MG
1 TABLET ORAL
Qty: 0 | Refills: 0 | DISCHARGE
Start: 2024-11-01

## 2024-11-01 RX ORDER — SENNA 187 MG
2 TABLET ORAL
Qty: 0 | Refills: 0 | DISCHARGE
Start: 2024-11-01

## 2024-11-01 RX ADMIN — KETOROLAC TROMETHAMINE 30 MILLIGRAM(S): 30 INJECTION INTRAMUSCULAR; INTRAVENOUS at 04:19

## 2024-11-01 RX ADMIN — Medication 500 MILLIGRAM(S): at 12:45

## 2024-11-01 RX ADMIN — Medication 1000 MILLIGRAM(S): at 05:18

## 2024-11-01 RX ADMIN — Medication 2 MILLIGRAM(S): at 10:38

## 2024-11-01 RX ADMIN — FLUTICASONE PROPIONATE AND SALMETEROL XINAFOATE 1 DOSE(S): 230; 21 AEROSOL, METERED RESPIRATORY (INHALATION) at 12:46

## 2024-11-01 RX ADMIN — FLUTICASONE PROPIONATE 1 SPRAY(S): 50 SPRAY, METERED NASAL at 12:46

## 2024-11-01 RX ADMIN — MONTELUKAST SODIUM 10 MILLIGRAM(S): 10 TABLET, FILM COATED ORAL at 12:45

## 2024-11-01 RX ADMIN — BUSPIRONE HYDROCHLORIDE 10 MILLIGRAM(S): 15 TABLET ORAL at 05:17

## 2024-11-01 RX ADMIN — Medication 4 GRAM(S): at 10:38

## 2024-11-01 RX ADMIN — TRAMADOL HYDROCHLORIDE 50 MILLIGRAM(S): 50 TABLET, COATED ORAL at 05:18

## 2024-11-01 RX ADMIN — Medication 1000 MILLIGRAM(S): at 00:06

## 2024-11-01 RX ADMIN — TRAMADOL HYDROCHLORIDE 50 MILLIGRAM(S): 50 TABLET, COATED ORAL at 06:16

## 2024-11-01 RX ADMIN — Medication 81 MILLIGRAM(S): at 12:45

## 2024-11-01 RX ADMIN — Medication 325 MILLIGRAM(S): at 12:45

## 2024-11-01 RX ADMIN — Medication 1000 MILLIGRAM(S): at 06:16

## 2024-11-01 RX ADMIN — Medication 30 MILLIGRAM(S): at 03:37

## 2024-11-01 RX ADMIN — Medication 25 MILLIGRAM(S): at 05:17

## 2024-11-01 RX ADMIN — FAMOTIDINE 40 MILLIGRAM(S): 10 INJECTION INTRAVENOUS at 12:45

## 2024-11-01 RX ADMIN — KETOROLAC TROMETHAMINE 30 MILLIGRAM(S): 30 INJECTION INTRAMUSCULAR; INTRAVENOUS at 05:18

## 2024-11-01 NOTE — DIETITIAN INITIAL EVALUATION ADULT - ADD RECOMMEND
1) Consider consistent carbohydrate diet as medically feasible.  2) Encourage PO intake and honor food preferences as able.  3) Monitor PO intake, labs, weights, BM's, and skin integrity.

## 2024-11-01 NOTE — PROGRESS NOTE ADULT - SUBJECTIVE AND OBJECTIVE BOX
KEVEN [id number]    Diagnosis: 59yFemale S/p Left Total Knee Replacement POD#3    Patient seen and evaluated at bedside. Patient with no acute events overnight.  Patient with no acute complaints.  Pain is well controlled, rated a 5/10 in severity  Admits to BM yesterday, voiding independently   Patient admits to being OOB with PT.  Patient denies Chest pain, SOB, N/V/D    T(C): 36.9 (11-01-24 @ 05:13), Max: 37.2 (10-31-24 @ 20:49)  HR: 87 (11-01-24 @ 05:13) (87 - 90)  BP: 108/72 (11-01-24 @ 05:13) (107/68 - 131/76)  RR: 17 (11-01-24 @ 05:13) (17 - 18)  SpO2: 95% (11-01-24 @ 05:13) (93% - 96%)      Physical Exam:  General: AAOx3, In NAD, Resting in bed comfortably  Left Knee: Dressing is C/D/I. Skin is pink and warm. Staples intact. No erythema. SILT.  Wound with no drainage, healing well.   Lower Extremities: Calves soft and NTTP b/l. 5/5 strength of FHL/EHL/ADF/APF b/l. SILT. NVI. 2+ DP pulses.                          10.0   12.75 )-----------( 214      ( 31 Oct 2024 06:55 )             29.7     10-31    141  |  109[H]  |  8   ----------------------------<  115[H]  4.3   |  28  |  0.63    Ca    8.4      31 Oct 2024 06:55        Impression: 59yFemaleS/p Left Total Knee Replacement POD#3  Plan:  - Pain management  - DVT ppx with Lovenox and venodynes  - WBAT to LLE with appropriate assistive device  - Heel bump to LLE while lying in bed  - Discharge planning  rehab today  - Incentive spirometry encouraged.   - Case d/w Dr. Sullivan  
KEVEN [id number]    Diagnosis: 59yFemale S/p Left Total Knee Replacement POD#1    Patient seen and evaluated at bedside. Patient with no acute events overnight. Patient with no acute complaints.  Patient is c/o left knee pain moderate to severe 6-7/10, is worse with movement of the left knee  Awaiting PT evaluation  Pt denies Fever, Chest pain, SOB, dyspnea, paresthesias, N/V/D, abdominal pain, syncope, or pain anywhere else.       T(C): 36.6 (10-30-24 @ 05:14), Max: 36.9 (10-29-24 @ 11:32)  HR: 84 (10-30-24 @ 05:14) (79 - 98)  BP: 94/60 (10-30-24 @ 05:14) (94/60 - 131/79)  RR: 17 (10-30-24 @ 05:14) (11 - 29)  SpO2: 95% (10-30-24 @ 05:14) (91% - 100%)    10-29-24 @ 07:01  -  10-30-24 @ 07:00  --------------------------------------------------------  IN: 0 mL / OUT: 350 mL / NET: -350 mL        Physical Exam:  General: AAOx3, In NAD, Resting in bed comfortably  Left Knee: Dressing is C/D/I. Dressing removed. Skin is pink and warm. Staples intact. Dressing replaced with new sterile 4x4, abd pad, and ace wrap. No erythema. SILT.    Wound with no drainage, healing well. No active bleeding at this time.  Lower Extremities: Calves soft and NTTP b/l. 5/5 strength of FHL/EHL/ADF/APF b/l. SILT. NVI. 2+ DP pulses.                          10.3   14.64 )-----------( 227      ( 30 Oct 2024 07:05 )             30.3     10-30    142  |  111[H]  |  10  ----------------------------<  155[H]  4.3   |  24  |  0.65    Ca    8.5      30 Oct 2024 07:05        Impression: 59yFemaleS/p Left Total Knee Replacement POD#1  Plan:  - Pain management  - DVT ppx with Lovenox and venodynes  - WBAT to LLE with appropriate assistive device  - Heel bump to LLE while lying in bed  - Discharge planning home vs rehab  - Continue with IV antibiotics x 24hours  - Incentive spirometry encouraged.   - Case d/w Dr. Sullivan  
Source of information: KEVEN COLIN, Chart review  Patient language: English  : n/a    HPI:  This is a 59 yr old  female with PMH of  mild CAD-on aspirin, HLD, fibromyalgia, psoriatic arthritis, allergic rhinitis, GERD, migraine headache associated with vertigo and nausea, anxiety, depression, PTSD, RA, asthma, PSH of cervical spine fusion, lumbar fusion, low back pain -s/p spinal cord stimulator insertion on 8/13/20, who presents with c/o left knee pain due to osteoarthritis. Pt reports worsening of pain with prolonged ambulation and standing. Pt is scheduled for left total knee replacement on 10/29/24.  (23 Oct 2024 07:33)    Pt is admitted for scheduled left total knee replacement with Dr. Sullivan on 10/29, POD 3. Pain service consulted for acute postoperative pain. Per patient, she has a history of chronic pain due to fibromyalgia and psoriatic arthritis. She has a spinal stimulator in place and see' s Dr. Bello for outpatient pain management. Pt seen and examined at bedside, this morning. At time of assessment, patient ambulating back to bed with PT reports pain score 7/10 SCALE USED: (1-10 VNRS). Pt describes pain as localized to the left knee joint, constant and throbbing in quality. The pain is alleviated by pain medication and is exacerbated by movement. Pt tolerating PO diet. Denies lethargy, chest pain, SOB, nausea, vomiting. Last BM 11/1. Patient stated goal for pain control: to be able to take deep breaths, get out of bed to chair and ambulate with tolerable pain control. To note, patient takes curaleaf CBD oil for chronic pain.     PAST MEDICAL & SURGICAL HISTORY:  Asthma    Anxiety    Depression    Constipation    Rheumatoid arthritis    Heartburn    Psoriatic arthritis    Primary osteoarthritis of right knee    Migraine headache    Fibromyalgia    GERD (gastroesophageal reflux disease)    CAD (coronary artery disease)    Allergic rhinitis    S/P right knee arthroscopy  x 3    H/O abdominal hysterectomy    H/O tubal ligation    History of arthroscopy of left shoulder    DJD (degenerative joint disease) of cervical spine  Plate and screws and 2 artificial discs.  Lumbar spine danilo and screws    Ankle fracture  right ankle arthroscopy    History of total knee replacement, right    S/P insertion of spinal cord stimulator    History of laparoscopic cholecystectomy    FAMILY HISTORY:  Family history of stroke (Father)    Family history of rheumatoid arthritis (Father)  mother and grandmother who passed away    Family history of ovarian cancer (Mother)    Family history of heart disease (Father)    Family history of hypertension (Father, Mother)    Family history of diabetes mellitus (Mother)    Social History:   [X ] Denies ETOH use, illicit drug use and smoking    Allergies    clindamycin (Hives; Rash)  Lamictal (Hives)  latex (Rash)  vancomycin (Hives)  erythromycin (Hives)  penicillins (Hives)  penicillin (Unknown)  Bactrim (Unknown)  Levaquin (Hives)  Bactrim DS (Hives)  Flagyl (Hives; Diarrhea)  azithromycin (Hives)    MEDICATIONS  (STANDING):  ascorbic acid 500 milliGRAM(s) Oral daily  aspirin enteric coated 81 milliGRAM(s) Oral daily  atorvastatin 20 milliGRAM(s) Oral at bedtime  buPROPion XL (24-Hour) . 300 milliGRAM(s) Oral daily  busPIRone 10 milliGRAM(s) Oral every 12 hours  cholestyramine Powder (Sugar-Free) 4 Gram(s) Oral daily  doxazosin 1 milliGRAM(s) Oral at bedtime  enoxaparin Injectable 30 milliGRAM(s) SubCutaneous every 12 hours  famotidine    Tablet 40 milliGRAM(s) Oral daily  ferrous    sulfate 325 milliGRAM(s) Oral daily  fluticasone propionate/ salmeterol 500-50 MICROgram(s) Diskus 1 Dose(s) Inhalation two times a day  meclizine 25 milliGRAM(s) Oral three times a day  montelukast 10 milliGRAM(s) Oral daily  polyethylene glycol 3350 17 Gram(s) Oral at bedtime  senna 2 Tablet(s) Oral at bedtime  sodium chloride 0.9%. 1000 milliLiter(s) (110 mL/Hr) IV Continuous <Continuous>  traMADol 50 milliGRAM(s) Oral every 8 hours    MEDICATIONS  (PRN):  acetaminophen 300 mG/butalbital 50 mG/ caffeine 40 mG 1 Capsule(s) Oral every 6 hours PRN migraine headache  albuterol    90 MICROgram(s) HFA Inhaler 2 Puff(s) Inhalation every 6 hours PRN for shortness of breath and/or wheezing  fluticasone propionate 50 MICROgram(s)/spray Nasal Spray 1 Spray(s) Both Nostrils two times a day PRN Allergies  HYDROmorphone   Tablet 2 milliGRAM(s) Oral every 4 hours PRN Severe Pain (7 - 10)  ketorolac   Injectable 30 milliGRAM(s) IV Push every 6 hours PRN Moderate Pain (4 - 6)  magnesium hydroxide Suspension 30 milliLiter(s) Oral daily PRN Constipation  ondansetron Injectable 4 milliGRAM(s) IV Push every 6 hours PRN Nausea and/or Vomiting    Vital Signs Last 24 Hrs  T(C): 36.9 (01 Nov 2024 05:13), Max: 37.2 (31 Oct 2024 20:49)  T(F): 98.5 (01 Nov 2024 05:13), Max: 98.9 (31 Oct 2024 20:49)  HR: 87 (01 Nov 2024 05:13) (87 - 90)  BP: 108/72 (01 Nov 2024 05:13) (107/68 - 131/76)  BP(mean): 81 (31 Oct 2024 20:49) (81 - 81)  RR: 17 (01 Nov 2024 05:13) (17 - 18)  SpO2: 95% (01 Nov 2024 05:13) (93% - 96%)    Parameters below as of 01 Nov 2024 05:13  Patient On (Oxygen Delivery Method): room air    LABS: Reviewed                          9.7    10.41 )-----------( 197      ( 01 Nov 2024 08:08 )             29.2     11-01    142  |  108  |  6[L]  ----------------------------<  121[H]  3.9   |  28  |  0.66    Ca    8.5      01 Nov 2024 08:08    Urinalysis Basic - ( 01 Nov 2024 08:08 )    Color: x / Appearance: x / SG: x / pH: x  Gluc: 121 mg/dL / Ketone: x  / Bili: x / Urobili: x   Blood: x / Protein: x / Nitrite: x   Leuk Esterase: x / RBC: x / WBC x   Sq Epi: x / Non Sq Epi: x / Bacteria: x    CAPILLARY BLOOD GLUCOSE    Radiology: None to be Reviewed.     ORT Score -   Family Hx of substance abuse	Female	      Male  Alcohol 	                                           1                     3  Illegal drugs	                                   2                     3  Rx drugs                                           4 	                  4  Personal Hx of substance abuse		  Alcohol 	                                          3	                  3  Illegal drugs                                     4	                  4  Rx drugs                                            5 	                  5  Age between 16- 45 years	           1                     1  hx preadolescent sexual abuse	   3 	                  0  Psychological disease		  ADD, OCD, bipolar, schizophrenia   2	          2  Depression                                           1 	          1  Total: 1    a score of 3 or lower indicates low risk for opioid abuse		  a score of 4-7 indicates moderate risk for opioid abuse		  a score of 8 or higher indicates high risk for opioid abuse  	  REVIEW OF SYSTEMS:  CONSTITUTIONAL: No fever or fatigue  HEENT:  No difficulty hearing, no change in vision  NECK: No pain or stiffness  RESPIRATORY: No cough, wheezing, chills or hemoptysis; No shortness of breath  CARDIOVASCULAR: No chest pain, palpitations, dizziness, or leg swelling  GASTROINTESTINAL: No loss of appetite, decreased PO intake. No abdominal or epigastric pain. No nausea, vomiting; No diarrhea + constipation.   GENITOURINARY: No dysuria, frequency, hematuria, retention or incontinence  MUSCULOSKELETAL: + left knee pain; + history chronic back and joint pain, no upper or lower motor strength weakness, no saddle anesthesia, bowel/bladder incontinence, no falls   NEURO: No headaches, + left LE numbness   PSYCHIATRIC: Hx of anxiety and depression     PHYSICAL EXAM:  GENERAL:  Alert & Oriented X4, cooperative, NAD, Good concentration. Speech is clear.   RESPIRATORY: Respirations even and unlabored. Clear to auscultation bilaterally  CARDIOVASCULAR: Normal S1/S2, regular rate and rhythm  GASTROINTESTINAL:  Soft, Nontender, Nondistended; Bowel sounds present  PERIPHERAL VASCULAR:  Extremities warm. 2+ Peripheral Pulses, No cyanosis, No calf tenderness  MUSCULOSKELETAL: Motor Strength 5/5 B/L upper and lower extremities; moves all extremities equally against gravity; decreased LLE ROM due to pain; + left knee tenderness on palpation + lumbar back SCS   SKIN: Warm, dry, left knee ace wrap C/D/I     Risk factors associated with adverse outcomes related to opioid treatment  [ ] Concurrent benzodiazepine use  [ ] History/ Active substance use or alcohol use disorder  [x] Psychiatric co-morbidity  [ ] Sleep apnea  [ ] COPD  [ ] BMI> 35  [ ] Liver dysfunction  [ ] Renal dysfunction  [ ] CHF  [ ] Smoker  [ ] Age > 60 years    [x  [place]  Reviewed and Copied to Chart. See below.    Plan of care and goal oriented pain management treatment options were discussed with patient and /or primary care giver; all questions and concerns were addressed and care was aligned with patient's wishes.    Educated patient on goal oriented pain management treatment options   
KEVEN COLIN  [id number]  59y    Orthopedic Surgery  Post-op Check    Dx: S/p L TKA POD#0    24 hr interval:  no spinal anesthesia given - h/o SCS placement  pain 6/10 of left knee   Pt tolerated procedure well.  No acute events.    Denies cp/sob/dyspnea/paresthesias    T(C): 36.4 (10-29-24 @ 17:13), Max: 36.9 (10-29-24 @ 11:32)  HR: 87 (10-29-24 @ 17:28) (79 - 96)  BP: 118/73 (10-29-24 @ 17:28) (104/69 - 125/71)  RR: 16 (10-29-24 @ 17:28) (11 - 26)  SpO2: 95% (10-29-24 @ 17:28) (91% - 99%)    PE:   left knee  dressing cdi  compartments soft  nvi silt  2+pulses   <2sec cap refill    Labs:                          12.0   13.26 )-----------( 255      ( 29 Oct 2024 17:39 )             35.2     10-29    141  |  109[H]  |  10  ----------------------------<  123[H]  3.6   |  25  |  0.70    Ca    8.9      29 Oct 2024 17:39        Impression: 59y s/p L TKA POD#0  Plan:  - Incentive Spirometry encouraged  - Condition: Stable  - Pain control  - dvt ppx - lmwh 30 mg sc q12hr  - daily pt - wbat of the left knee  - post-op abx x24hrs  - Transfer to: Surgical floor  - Case dw attding dr boyle
KEVEN [id number]    Diagnosis: 59yFemale S/p Left Total Knee Replacement POD#2    Patient seen and evaluated at bedside. Patient with no acute events overnight.  Patient with no acute complaints.  Pain is well controlled   Patient denies Chest pain, SOB, N/V/D    T(C): 36.7 (10-31-24 @ 05:14), Max: 36.8 (10-30-24 @ 21:08)  HR: 83 (10-31-24 @ 05:14) (80 - 92)  BP: 97/60 (10-31-24 @ 05:14) (94/49 - 123/70)  RR: 17 (10-31-24 @ 05:14) (16 - 18)  SpO2: 96% (10-31-24 @ 05:14) (94% - 96%)      Physical Exam:  General: AAOx3, In NAD, Resting in bed comfortably  Left Knee: Dressing is C/D/I. Skin is pink and warm. Staples intact. No erythema. SILT.  Wound with no drainage, healing well.   Lower Extremities: Calves soft and NTTP b/l. 5/5 strength of FHL/EHL/ADF/APF b/l. SILT. NVI. 2+ DP pulses.                          10.3   13.84 )-----------( 222      ( 30 Oct 2024 13:15 )             31.1     10-30    142  |  111[H]  |  10  ----------------------------<  155[H]  4.3   |  24  |  0.65    Ca    8.5      30 Oct 2024 07:05        Impression: 59yFemaleS/p Left Total Knee Replacement POD#2  Plan:  - Pain management  - DVT ppx with Lovenox and venodynes  - WBAT to LLE with appropriate assistive device  - Heel bump to LLE while lying in bed  - Discharge planning rehab  - Incentive spirometry encouraged.   - Case d/w Dr. Sullivan  
Source of information: KEVEN COLIN, Chart review  Patient language: English  : n/a    HPI:  This is a 59 yr old  female with PMH of  mild CAD-on aspirin, HLD, fibromyalgia, psoriatic arthritis, allergic rhinitis, GERD, migraine headache associated with vertigo and nausea, anxiety, depression, PTSD, RA, asthma, PSH of cervical spine fusion, lumbar fusion, low back pain -s/p spinal cord stimulator insertion on 8/13/20, who presents with c/o left knee pain due to osteoarthritis. Pt reports worsening of pain with prolonged ambulation and standing. Pt is scheduled for left total knee replacement on 10/29/24.  (23 Oct 2024 07:33)    Pt is admitted for scheduled left total knee replacement with Dr. Sullivan on 10/29, POD 2. Pain service consulted for acute postoperative pain. Pt seen and examined at bedside, this morning. At time of assessment, patient laying in bed reports left knee pain score 10/10 SCALE USED: (1-10 VNRS). Primary RN notified to administer PRN  pain medication as ordered. Per patient, she has a history of chronic pain due to fibromyalgia and psoriatic arthritis. She has a spinal stimulator in place and see' s Dr. Bello for outpatient pain management. Pt states she will turn on her SCS today. Pt describes pain as localized to the left knee joint, constant and throbbing in quality. The pain is alleviated by pain medication and is exacerbated by movement. Pt tolerating PO diet. Denies lethargy, chest pain, SOB, nausea, vomiting. Reports constipation, last BM 10/30. Patient stated goal for pain control: to be able to take deep breaths, get out of bed to chair and ambulate with tolerable pain control. To note, patient takes curaleaf CBD oil for chronic pain.     PAST MEDICAL & SURGICAL HISTORY:  Asthma    Anxiety    Depression    Constipation    Rheumatoid arthritis    Heartburn    Psoriatic arthritis    Primary osteoarthritis of right knee    Migraine headache    Fibromyalgia    GERD (gastroesophageal reflux disease)    CAD (coronary artery disease)    Allergic rhinitis    S/P right knee arthroscopy  x 3    H/O abdominal hysterectomy    H/O tubal ligation    History of arthroscopy of left shoulder    DJD (degenerative joint disease) of cervical spine  Plate and screws and 2 artificial discs.  Lumbar spine danilo and screws    Ankle fracture  right ankle arthroscopy    History of total knee replacement, right    S/P insertion of spinal cord stimulator    History of laparoscopic cholecystectomy      FAMILY HISTORY:  Family history of stroke (Father)    Family history of rheumatoid arthritis (Father)  mother and grandmother who passed away    Family history of ovarian cancer (Mother)    Family history of heart disease (Father)    Family history of hypertension (Father, Mother)    Family history of diabetes mellitus (Mother)        Social History:   [X ] Denies ETOH use, illicit drug use and smoking    Allergies    clindamycin (Hives; Rash)  Lamictal (Hives)  latex (Rash)  vancomycin (Hives)  erythromycin (Hives)  penicillins (Hives)  penicillin (Unknown)  Bactrim (Unknown)  Levaquin (Hives)  Bactrim DS (Hives)  Flagyl (Hives; Diarrhea)  azithromycin (Hives)    MEDICATIONS  (STANDING):  acetaminophen     Tablet .. 1000 milliGRAM(s) Oral every 6 hours  ascorbic acid 500 milliGRAM(s) Oral daily  aspirin enteric coated 81 milliGRAM(s) Oral daily  atorvastatin 20 milliGRAM(s) Oral at bedtime  bisacodyl 5 milliGRAM(s) Oral once  buPROPion XL (24-Hour) . 300 milliGRAM(s) Oral daily  busPIRone 10 milliGRAM(s) Oral every 12 hours  cholestyramine Powder (Sugar-Free) 4 Gram(s) Oral daily  doxazosin 1 milliGRAM(s) Oral at bedtime  enoxaparin Injectable 30 milliGRAM(s) SubCutaneous every 12 hours  famotidine    Tablet 40 milliGRAM(s) Oral daily  ferrous    sulfate 325 milliGRAM(s) Oral daily  fluticasone propionate/ salmeterol 500-50 MICROgram(s) Diskus 1 Dose(s) Inhalation two times a day  meclizine 25 milliGRAM(s) Oral three times a day  montelukast 10 milliGRAM(s) Oral daily  polyethylene glycol 3350 17 Gram(s) Oral at bedtime  senna 2 Tablet(s) Oral at bedtime  sodium chloride 0.9%. 1000 milliLiter(s) (110 mL/Hr) IV Continuous <Continuous>  traMADol 50 milliGRAM(s) Oral every 8 hours    MEDICATIONS  (PRN):  acetaminophen 300 mG/butalbital 50 mG/ caffeine 40 mG 1 Capsule(s) Oral every 6 hours PRN migraine headache  albuterol    90 MICROgram(s) HFA Inhaler 2 Puff(s) Inhalation every 6 hours PRN for shortness of breath and/or wheezing  fluticasone propionate 50 MICROgram(s)/spray Nasal Spray 1 Spray(s) Both Nostrils two times a day PRN Allergies  HYDROmorphone   Tablet 2 milliGRAM(s) Oral every 4 hours PRN Severe Pain (7 - 10)  ketorolac   Injectable 30 milliGRAM(s) IV Push every 6 hours PRN Moderate Pain (4 - 6)  magnesium hydroxide Suspension 30 milliLiter(s) Oral daily PRN Constipation  ondansetron Injectable 4 milliGRAM(s) IV Push every 6 hours PRN Nausea and/or Vomiting      Vital Signs Last 24 Hrs  T(C): 36.7 (31 Oct 2024 05:14), Max: 36.8 (30 Oct 2024 21:08)  T(F): 98 (31 Oct 2024 05:14), Max: 98.3 (30 Oct 2024 21:08)  HR: 83 (31 Oct 2024 05:14) (80 - 92)  BP: 97/60 (31 Oct 2024 05:14) (94/49 - 123/70)  BP(mean): 72 (31 Oct 2024 05:14) (65 - 88)  RR: 17 (31 Oct 2024 05:14) (16 - 18)  SpO2: 96% (31 Oct 2024 05:14) (94% - 96%)    Parameters below as of 31 Oct 2024 05:14  Patient On (Oxygen Delivery Method): room air        LABS: Reviewed                          10.0   12.75 )-----------( 214      ( 31 Oct 2024 06:55 )             29.7     10-31    141  |  109[H]  |  8   ----------------------------<  115[H]  4.3   |  28  |  0.63    Ca    8.4      31 Oct 2024 06:55          Urinalysis Basic - ( 31 Oct 2024 06:55 )    Color: x / Appearance: x / SG: x / pH: x  Gluc: 115 mg/dL / Ketone: x  / Bili: x / Urobili: x   Blood: x / Protein: x / Nitrite: x   Leuk Esterase: x / RBC: x / WBC x   Sq Epi: x / Non Sq Epi: x / Bacteria: x    Radiology: None to be Reviewed.     ORT Score -   Family Hx of substance abuse	Female	      Male  Alcohol 	                                           1                     3  Illegal drugs	                                   2                     3  Rx drugs                                           4 	                  4  Personal Hx of substance abuse		  Alcohol 	                                          3	                  3  Illegal drugs                                     4	                  4  Rx drugs                                            5 	                  5  Age between 16- 45 years	           1                     1  hx preadolescent sexual abuse	   3 	                  0  Psychological disease		  ADD, OCD, bipolar, schizophrenia   2	          2  Depression                                           1 	          1  Total: 1    a score of 3 or lower indicates low risk for opioid abuse		  a score of 4-7 indicates moderate risk for opioid abuse		  a score of 8 or higher indicates high risk for opioid abuse  	  REVIEW OF SYSTEMS:  CONSTITUTIONAL: No fever or fatigue  HEENT:  No difficulty hearing, no change in vision  NECK: No pain or stiffness  RESPIRATORY: No cough, wheezing, chills or hemoptysis; No shortness of breath  CARDIOVASCULAR: No chest pain, palpitations, dizziness, or leg swelling  GASTROINTESTINAL: No loss of appetite, decreased PO intake. No abdominal or epigastric pain. No nausea, vomiting; No diarrhea + constipation.   GENITOURINARY: No dysuria, frequency, hematuria, retention or incontinence  MUSCULOSKELETAL: + left knee pain; + history chronic back and joint pain, no upper or lower motor strength weakness, no saddle anesthesia, bowel/bladder incontinence, no falls   NEURO: No headaches, + left LE numbness;   PSYCHIATRIC: Hx of anxiety and depression     PHYSICAL EXAM:  GENERAL:  Alert & Oriented X4, cooperative, NAD, Good concentration. Speech is clear.   RESPIRATORY: Respirations even and unlabored. Clear to auscultation bilaterally  CARDIOVASCULAR: Normal S1/S2, regular rate and rhythm  GASTROINTESTINAL:  Soft, Nontender, Nondistended; Bowel sounds present  PERIPHERAL VASCULAR:  Extremities warm. 2+ Peripheral Pulses, No cyanosis, No calf tenderness  MUSCULOSKELETAL: Motor Strength 5/5 B/L upper and lower extremities; moves all extremities equally against gravity; decreased LLE ROM due to pain; + left knee tenderness on palpation + lumbar back SCS   SKIN: Warm, dry, left knee ace wrap C/D/I     Risk factors associated with adverse outcomes related to opioid treatment  [ ] Concurrent benzodiazepine use  [ ] History/ Active substance use or alcohol use disorder  [x] Psychiatric co-morbidity  [ ] Sleep apnea  [ ] COPD  [ ] BMI> 35  [ ] Liver dysfunction  [ ] Renal dysfunction  [ ] CHF  [ ] Smoker  [ ] Age > 60 years    [x  [place]  Reviewed and Copied to Chart. See below.    Plan of care and goal oriented pain management treatment options were discussed with patient and /or primary care giver; all questions and concerns were addressed and care was aligned with patient's wishes.    Educated patient on goal oriented pain management treatment options     10-31-24 @ 10:48

## 2024-11-01 NOTE — PROGRESS NOTE ADULT - PROBLEM SELECTOR PLAN 1
Pt with acute left knee pain which is somatic and neuropathic in nature due to primary OA. Patient is status post Left Total Knee Replacement wit Dr. Sullivan on 10/29, POD 3. Patient has a history of chronic pain due to fibromyalgia and psoriatic arthritis. She has a spinal stimulator in place and see' s Dr. Bello for outpatient pain management. To note, patient takes curaleaf CBD oil for chronic pain.   Opioid pain recommendations   - Continue Tramadol 50mg PO q 8 hours. Monitor for sedation/ respiratory depression.   - Continue Dilaudid 2mg PO q 4 hours PRN severe pain. Monitor for sedation/ respiratory depression.   Non-opioid pain recommendations   - Continue Toradol 30 mg IVP q 6 hours PRN moderate pain  - Completed Acetaminophen 1 gram PO q 8 hours. Monitor LFTs  Bowel Regimen  - Continue Miralax 17G PO daily  - Continue Senna 2 tablets at bedtime for constipation  - Dulcolax 5mg PO once.   Mild pain   - Non-pharmacological pain treatment recommendations  - Warm/ Cool packs PRN   - Repositioning extremity, elevation, imagery, relaxation, distraction.  - Physical therapy OOB if no contraindications   Recommendations discussed with primary team and RN.  Upon discharge – dc on Percocet 5/325mg po q 6 hours prn for 1 week. Pt to take OTC stool softeners.
Pt with acute left knee pain which is somatic and neuropathic in nature due to primary OA. Patient is status post Left Total Knee Replacement wit Dr. Sullivan on 10/29, POD 2. Patient has a history of chronic pain due to fibromyalgia and psoriatic arthritis. She has a spinal stimulator in place and see' s Dr. Bello for outpatient pain management. To note, patient takes curaleaf CBD oil for chronic pain.   Opioid pain recommendations   - Continue Tramadol 50mg PO q 8 hours. Monitor for sedation/ respiratory depression.   - Change dialudid 2mg PO q 4 hours PRN severe pain. Monitor for sedation/ respiratory depression.   Non-opioid pain recommendations   - Continue Toradol 30 mg IVP q 6 hours PRN moderate pain  - Change Acetaminophen 1 gram PO q 8 hours for 24 hours only. Monitor LFTs  Bowel Regimen  - Continue Miralax 17G PO daily  - Continue Senna 2 tablets at bedtime for constipation  - Dulcolax 5mg PO once.   Mild pain   - Non-pharmacological pain treatment recommendations  - Warm/ Cool packs PRN   - Repositioning extremity, elevation, imagery, relaxation, distraction.  - Physical therapy OOB if no contraindications   Recommendations discussed with primary team and RN.  Upon discharge – dc on Percocet 5/325mg po q 6 hours prn for 1 week. Pt to take OTC stool softeners.

## 2024-11-01 NOTE — DISCHARGE NOTE PROVIDER - NSDCMRMEDTOKEN_GEN_ALL_CORE_FT
Advair Diskus 500 mcg-50 mcg inhalation powder: 1 puff(s) inhaled 2 times a day  Albuterol (Eqv-Ventolin HFA) 90 mcg/inh inhalation aerosol: 2 puff(s) inhaled every 8 hours as needed for  shortness of breath and/or wheezing  ascorbic acid 500 mg oral tablet: 1 tab(s) orally 2 times a day  aspirin 81 mg oral delayed release tablet: 1 tab(s) orally once a day  atorvastatin 20 mg oral tablet: 1 tab(s) orally once a day  buPROPion 300 mg/24 hours (XL) oral tablet, extended release: 1 tab(s) orally once a day  busPIRone 5 mg oral tablet: 1 tab(s) orally 4 times a day  cholestyramine 4 g/9 g oral powder for reconstitution: 4 gram(s) orally once a day  famotidine 40 mg oral tablet: 1 tab(s) orally once a day (at bedtime)  ferrous sulfate 325 mg (65 mg elemental iron) oral tablet: 1 tab(s) orally once a day  Flonase 50 mcg/inh nasal spray: 1 spray(s) nasal once a day  Lovenox 40 mg/0.4 mL injectable solution: 40 milligram(s) subcutaneously once a day for 12 days  meclizine 25 mg oral tablet: 1 tab(s) orally 3 times a day for vertigo  ondansetron 4 mg oral tablet, disintegratin tab(s) orally every 8 hours as needed for  nausea  polyethylene glycol 3350 oral powder for reconstitution: 17 gram(s) orally once a day (at bedtime)  prazosin 1 mg oral capsule: 1 cap(s) orally once a day (at bedtime)  Qulipta 60 mg oral tablet: 1 tab(s) orally once a day  Quviviq 25 mg oral tablet: 1 tab(s) orally once a day (at bedtime)  Savella 50 mg oral tablet: 1 tab(s) orally 2 times a day  senna leaf extract oral tablet: 2 tab(s) orally once a day (at bedtime)  Simponi: 150 milligram(s) intravenous every 2 months for RA  Singulair 10 mg oral tablet: 1 tab(s) orally once a day (in the evening)  traMADol 50 mg oral tablet: 1 tab(s) orally every 8 hours as needed for  severe pain

## 2024-11-01 NOTE — PROGRESS NOTE ADULT - ASSESSMENT
Search Terms: Elizabeth Harvey, 1965Search Date: 10/30/2024 08:53:48 AM  The Drug Utilization Report below displays all of the controlled substance prescriptions, if any, that your patient has filled in the last twelve months. The information displayed on this report is compiled from pharmacy submissions to the Department, and accurately reflects the information as submitted by the pharmacies.    This report was requested by: Nicolasa Whitehead | Reference #: [id number]    Practitioner Count: 2  Pharmacy Count: 2  Current Opioid Prescriptions: 1  Current Benzodiazepine Prescriptions: 0  Current Stimulant Prescriptions: 0      Patient Demographic Information (PDI)       PDI	First Name	Last Name	Birth Date	Gender	Street Address	City	State	Zip Code  A	Elizabeth Harvey	1965	Female	[address]	[facility]	[address]  B	Elizabeth Harvey	1965	Female	[address]	[place]	[address]  C	Elizabeth Harvey	1965	Female	[address] 	[place]	[address]    Prescription Information      PDI Filter:    PDI	My Rx	Current Rx	Drug Type	Rx Written	Rx Dispensed	Drug	Quantity	Days Supply	Prescriber Name	Prescriber PATRICIA #	Payment Method	Dispenser  A	N	Y	O	10/22/2024	10/25/2024	oxycodone-acetaminophen 7.5-325 mg tablet	28	7	iVtor Sullivan MD	[id number]	Insurance	[facility]  A	N	N	O	07/15/2024	07/15/2024	tramadol hcl 50 mg tablet	28	9	Vitor Sullivan MD	[id number]	Insurance	[facility]  A	N	N	O	07/02/2024	07/05/2024	oxycodone-acetaminophen 7.5-325 mg tablet	28	7	Vitor Sullivan MD	[id number]	Insurance	[facility]  B	N	N	O	09/05/2024	09/05/2024	tramadol hcl 50 mg tablet	90	30	Abraham Alamo	[id number]	Insurance	[facility] [id number]  C	N	N		05/31/2023	02/06/2024	curaleaf indica 75% (20:1) 2.5mg thc / 0.125mg cbd/dose vape	1	2	Koko Vines DO	[id number]	[facility]  C	N	N		05/31/2023	02/06/2024	curaleaf indica 75% (20:1) 2.5mg thc / 0.125mg cbd/dose vape	1	2	Koko Vines DO	[id number]	[place]	[facility] [place]  C	N	N		05/31/2023	12/18/2023	curaleaf indica 75% (20:1) 2.5mg thc / 0.125mg cbd/dose vape	1	2	Koko Vines DO	[id number]	[place]	[facility] [place]  C	N	N		05/31/2023	12/18/2023	curaleaf indica 75% (20:1) 2.5mg thc / 0.125mg cbd/dose vape	2	4	Koko Vines DO	[id number]	[place]	[facility] [place]  C	N	N		05/31/2023	12/18/2023	curaleaf indica 75% (20:1) 2.5mg thc / 0.125mg cbd/dose vape	3	6	Koko Vines DO	[id number]	[place]	[facility]    * - Details of Drug Type : O = Opioid, B = Benzodiazepine, S = Stimulant
Search Terms: Elizabeth Harvey, 1965Search Date: 10/30/2024 08:53:48 AM  The Drug Utilization Report below displays all of the controlled substance prescriptions, if any, that your patient has filled in the last twelve months. The information displayed on this report is compiled from pharmacy submissions to the Department, and accurately reflects the information as submitted by the pharmacies.    This report was requested by: Nicolasa Whitehead | Reference #: [id number]    Practitioner Count: 2  Pharmacy Count: 2  Current Opioid Prescriptions: 1  Current Benzodiazepine Prescriptions: 0  Current Stimulant Prescriptions: 0      Patient Demographic Information (PDI)       PDI	First Name	Last Name	Birth Date	Gender	Street Address	City	State	Zip Code  A	Elizabeth Harvey	1965	Female	[address]	[facility]	[address]  B	Elizabeth Harvey	1965	Female	[address]	[place]	[address]  C	Elizabeth Harvey	1965	Female	[address] [facility] [facility] 	[place]	[address]    Prescription Information      PDI Filter:    PDI	My Rx	Current Rx	Drug Type	Rx Written	Rx Dispensed	Drug	Quantity	Days Supply	Prescriber Name	Prescriber PATRICIA #	Payment Method	Dispenser  A	N	Y	O	10/22/2024	10/25/2024	oxycodone-acetaminophen 7.5-325 mg tablet	28	7	Vitor Sullivan MD	[id number]	Insurance	[facility]  A	N	N	O	07/15/2024	07/15/2024	tramadol hcl 50 mg tablet	28	9	Vitor Sullivan MD	[id number]	Insurance	[facility]  A	N	N	O	07/02/2024	07/05/2024	oxycodone-acetaminophen 7.5-325 mg tablet	28	7	Vitor Sullivan MD	[id number]	Insurance	[facility]  B	N	N	O	09/05/2024	09/05/2024	tramadol hcl 50 mg tablet	90	30	Abraham Alamo	[id number]	Insurance	[facility] [id number]  C	N	N		05/31/2023	02/06/2024	curaleaf indica 75% (20:1) 2.5mg thc / 0.125mg cbd/dose vape	1	2	Koko Vines DO	[id number]	[facility]  C	N	N		05/31/2023	02/06/2024	curaleaf indica 75% (20:1) 2.5mg thc / 0.125mg cbd/dose vape	1	2	Koko Vines DO	[id number]	[place]ell	[facility] [place]  C	N	N		05/31/2023	12/18/2023	curaleaf indica 75% (20:1) 2.5mg thc / 0.125mg cbd/dose vape	1	2	Koko Vines DO	[id number]	Snell	[facility] [place]  C	N	N		05/31/2023	12/18/2023	curaleaf indica 75% (20:1) 2.5mg thc / 0.125mg cbd/dose vape	2	4	Koko Vines DO	[id number]	[place]ell	[facility] [place]  C	N	N		05/31/2023	12/18/2023	curaleaf indica 75% (20:1) 2.5mg thc / 0.125mg cbd/dose vape	3	6	Koko Vines DO	[id number]	[place]	[facility]    * - Details of Drug Type : O = Opioid, B = Benzodiazepine, S = Stimulant

## 2024-11-01 NOTE — DIETITIAN INITIAL EVALUATION ADULT - LITERATURE/VIDEOS GIVEN
Discussed with pt what Hba1c is and what pt's Hba1c is, and how to prevent developing diabetes through nutrition therapy.

## 2024-11-01 NOTE — DISCHARGE NOTE PROVIDER - CARE PROVIDER_API CALL
Vitor Sullivan  Orthopaedic Surgery  [address] [place] [address], [address] [place] [address]  Phone: [phone]  Fax: [phone]  Follow Up Time: 2 weeks

## 2024-11-01 NOTE — DISCHARGE NOTE NURSING/CASE MANAGEMENT/SOCIAL WORK - PATIENT PORTAL LINK FT
You can access the FollowMyHealth Patient Portal offered by [facility] by registering at the following website: [email or web address]. By joining [employer]’s FollowMyHealth portal, you will also be able to view your health information using other applications (apps) compatible with our system.

## 2024-11-01 NOTE — DIETITIAN INITIAL EVALUATION ADULT - PERTINENT MEDS FT
MEDICATIONS  (STANDING):  ascorbic acid 500 milliGRAM(s) Oral daily  aspirin enteric coated 81 milliGRAM(s) Oral daily  atorvastatin 20 milliGRAM(s) Oral at bedtime  buPROPion XL (24-Hour) . 300 milliGRAM(s) Oral daily  busPIRone 10 milliGRAM(s) Oral every 12 hours  cholestyramine Powder (Sugar-Free) 4 Gram(s) Oral daily  doxazosin 1 milliGRAM(s) Oral at bedtime  enoxaparin Injectable 30 milliGRAM(s) SubCutaneous every 12 hours  famotidine    Tablet 40 milliGRAM(s) Oral daily  ferrous    sulfate 325 milliGRAM(s) Oral daily  fluticasone propionate/ salmeterol 500-50 MICROgram(s) Diskus 1 Dose(s) Inhalation two times a day  meclizine 25 milliGRAM(s) Oral three times a day  montelukast 10 milliGRAM(s) Oral daily  polyethylene glycol 3350 17 Gram(s) Oral at bedtime  senna 2 Tablet(s) Oral at bedtime  sodium chloride 0.9%. 1000 milliLiter(s) (110 mL/Hr) IV Continuous <Continuous>  traMADol 50 milliGRAM(s) Oral every 8 hours    MEDICATIONS  (PRN):  acetaminophen 300 mG/butalbital 50 mG/ caffeine 40 mG 1 Capsule(s) Oral every 6 hours PRN migraine headache  albuterol    90 MICROgram(s) HFA Inhaler 2 Puff(s) Inhalation every 6 hours PRN for shortness of breath and/or wheezing  fluticasone propionate 50 MICROgram(s)/spray Nasal Spray 1 Spray(s) Both Nostrils two times a day PRN Allergies  HYDROmorphone   Tablet 2 milliGRAM(s) Oral every 4 hours PRN Severe Pain (7 - 10)  ketorolac   Injectable 30 milliGRAM(s) IV Push every 6 hours PRN Moderate Pain (4 - 6)  magnesium hydroxide Suspension 30 milliLiter(s) Oral daily PRN Constipation  ondansetron Injectable 4 milliGRAM(s) IV Push every 6 hours PRN Nausea and/or Vomiting

## 2024-11-01 NOTE — DISCHARGE NOTE NURSING/CASE MANAGEMENT/SOCIAL WORK - FINANCIAL ASSISTANCE
[facility] provides services at a reduced cost to those who are determined to be eligible through [facility]’s financial assistance program. Information regarding [facility]’s financial assistance program can be found by going to [email or web address] or by calling [phone].

## 2024-11-01 NOTE — DIETITIAN INITIAL EVALUATION ADULT - ORAL INTAKE PTA/DIET HISTORY
Spoke to pt at bedside, pt reported no new food allergies or intolerances. Pt does not take any vitamins or supplements at home. Pt's intake was good PTA however pt's appetite was poor due to medications she was taking. Reported UBW:162 lbs, no recent significant weight changes.  Nutrition interview: No recent episodes of nausea, vomiting, diarrhea or constipation per pt. Last BM noted on 11/1 per pt. Denies any chewing/swallowing difficulties. Intake is % per pt and per tray observation. Food preferences explored and forwarded to dietary. Hba1c - 5.9% - 10/23

## 2024-11-01 NOTE — DISCHARGE NOTE PROVIDER - NSDCCPCAREPLAN_GEN_ALL_CORE_FT
PRINCIPAL DISCHARGE DIAGNOSIS  Diagnosis: Primary osteoarthritis of left knee  Assessment and Plan of Treatment: Pain Management- *See Attached Medication Reconciliation  Weight Bearing Status:  WBAT to LLE with walker  Equipment needs: Commode, Walker  Dressing: Please keep bandage/dressing Clean, Dry, and Intact.  Dressing to be removed only if not in good condition or when staples are to be removed. If dressing is violated, change with dressing every 2- 3 days with 4x4, abd pads, and ACE bandage.   Dvt prophylaxis: Lovenox 40 mg daily for 12 days. Incision site: NURSING to remove staples on 11/13/24  PT/Occupational Therapy are Activities of Daily Living as appropriate  Follow up with Dr. Sullivan in 2 WEEKS at [phone] after YOLANDA ARE REMOVED      SECONDARY DISCHARGE DIAGNOSES  Diagnosis: HLD (hyperlipidemia)  Assessment and Plan of Treatment: Continue with home medications as per Primary Care Provider recommendations    Diagnosis: Other depressive disorder  Assessment and Plan of Treatment: Continue with home medications as per Primary Care Provider recommendations    Diagnosis: Fibromyalgia  Assessment and Plan of Treatment: Continue with home medications as per Primary Care Provider recommendations    Diagnosis: CAD (coronary artery disease)  Assessment and Plan of Treatment: Continue with home medications as per Primary Care Provider recommendations

## 2024-11-01 NOTE — DIETITIAN INITIAL EVALUATION ADULT - NSICDXPASTMEDICALHX_GEN_ALL_CORE_FT
PAST MEDICAL HISTORY:  Allergic rhinitis     Anxiety     Asthma     CAD (coronary artery disease)     Constipation     Depression     Fibromyalgia     GERD (gastroesophageal reflux disease)     Heartburn     Migraine headache     Primary osteoarthritis of right knee     Psoriatic arthritis     Rheumatoid arthritis

## 2024-11-01 NOTE — DISCHARGE NOTE PROVIDER - HOSPITAL COURSE
Pt is a 60 y/o F who underwent elective Left total knee replacement on 10/29/24. No complications. Pt received daily Physical therapy and Deep vein thrombosis prophylaxis postoperatively. Stable for discharge rehab.

## 2024-11-01 NOTE — DISCHARGE NOTE PROVIDER - NSDCCPTREATMENT_GEN_ALL_CORE_FT
PRINCIPAL PROCEDURE  Procedure: Left total knee replacement  Findings and Treatment: s/p Left total knee replacement on 10/29/24  If patient has drainage from the wound, please contact the surgeon's office.   If patient has intractable pain, please contact the surgeon's office.   If excessively warm at the incision site is noted, please contact the surgeon's office.   If redness is noted, especially spreading, please contact the surgeon's office.   If patient has fever over 101F please return to the hospital through the Emergency Room.   If génesis blood is saturating the dressing, please return to the hospital through the Emergency Room.  If drainage of fluid or pus is noted, please return to the hospital through the Emergency Room.

## 2024-11-01 NOTE — DIETITIAN INITIAL EVALUATION ADULT - PERTINENT LABORATORY DATA
11-01    142  |  108  |  6[L]  ----------------------------<  121[H]  3.9   |  28  |  0.66    Ca    8.5      01 Nov 2024 08:08    A1C with Estimated Average Glucose Result: 5.9 % (10-23-24 @ 08:45)

## 2024-11-01 NOTE — DIETITIAN INITIAL EVALUATION ADULT - NSICDXPASTSURGICALHX_GEN_ALL_CORE_FT
PAST SURGICAL HISTORY:  Ankle fracture right ankle arthroscopy    DJD (degenerative joint disease) of cervical spine Plate and screws and 2 artificial discs.  Lumbar spine danilo and screws    H/O abdominal hysterectomy     H/O tubal ligation     History of arthroscopy of left shoulder     History of laparoscopic cholecystectomy     History of total knee replacement, right     S/P insertion of spinal cord stimulator     S/P right knee arthroscopy x 3

## 2024-11-01 NOTE — DISCHARGE NOTE PROVIDER - NSDCFUADDAPPT_GEN_ALL_CORE_FT
APPTS ARE READY TO BE MADE: [X] YES    Best Family or Patient Contact (if needed):  N/A  Additional Information about above appointments (if needed):  N/A  Other comments or requests:    APPTS ARE READY TO BE MADE: [X] YES    Best Family or Patient Contact (if needed):  N/A  Additional Information about above appointments (if needed):  N/A  Other comments or requests:     Patient is being discharged to Rehab. Caregiver will arrange follow up.

## 2024-11-06 LAB — SURGICAL PATHOLOGY STUDY: SIGNIFICANT CHANGE UP

## 2024-11-20 PROCEDURE — 83036 HEMOGLOBIN GLYCOSYLATED A1C: CPT

## 2024-11-20 PROCEDURE — G0463: CPT

## 2024-11-20 PROCEDURE — 86850 RBC ANTIBODY SCREEN: CPT

## 2024-11-20 PROCEDURE — 86901 BLOOD TYPING SEROLOGIC RH(D): CPT

## 2024-11-20 PROCEDURE — 87640 STAPH A DNA AMP PROBE: CPT

## 2024-11-20 PROCEDURE — 36415 COLL VENOUS BLD VENIPUNCTURE: CPT

## 2024-11-20 PROCEDURE — 87641 MR-STAPH DNA AMP PROBE: CPT

## 2024-11-20 PROCEDURE — 86900 BLOOD TYPING SEROLOGIC ABO: CPT

## 2024-11-26 PROCEDURE — 86901 BLOOD TYPING SEROLOGIC RH(D): CPT

## 2024-11-26 PROCEDURE — 85027 COMPLETE CBC AUTOMATED: CPT

## 2024-11-26 PROCEDURE — C1713: CPT

## 2024-11-26 PROCEDURE — 97110 THERAPEUTIC EXERCISES: CPT

## 2024-11-26 PROCEDURE — 97116 GAIT TRAINING THERAPY: CPT

## 2024-11-26 PROCEDURE — 97530 THERAPEUTIC ACTIVITIES: CPT

## 2024-11-26 PROCEDURE — 86850 RBC ANTIBODY SCREEN: CPT

## 2024-11-26 PROCEDURE — 88305 TISSUE EXAM BY PATHOLOGIST: CPT

## 2024-11-26 PROCEDURE — 36415 COLL VENOUS BLD VENIPUNCTURE: CPT

## 2024-11-26 PROCEDURE — 86900 BLOOD TYPING SEROLOGIC ABO: CPT

## 2024-11-26 PROCEDURE — C1776: CPT

## 2024-11-26 PROCEDURE — 94640 AIRWAY INHALATION TREATMENT: CPT

## 2024-11-26 PROCEDURE — 88311 DECALCIFY TISSUE: CPT

## 2024-11-26 PROCEDURE — 73560 X-RAY EXAM OF KNEE 1 OR 2: CPT

## 2024-11-26 PROCEDURE — 80048 BASIC METABOLIC PNL TOTAL CA: CPT

## 2024-11-26 PROCEDURE — 97162 PT EVAL MOD COMPLEX 30 MIN: CPT

## 2025-07-25 ENCOUNTER — EMERGENCY (EMERGENCY)
Facility: HOSPITAL | Age: 60
LOS: 1 days | End: 2025-07-25
Attending: EMERGENCY MEDICINE
Payer: MEDICARE

## 2025-07-25 VITALS
RESPIRATION RATE: 18 BRPM | HEART RATE: 103 BPM | WEIGHT: 175.05 LBS | TEMPERATURE: 98 F | DIASTOLIC BLOOD PRESSURE: 80 MMHG | HEIGHT: 59 IN | OXYGEN SATURATION: 97 % | SYSTOLIC BLOOD PRESSURE: 125 MMHG

## 2025-07-25 DIAGNOSIS — Z96.89 PRESENCE OF OTHER SPECIFIED FUNCTIONAL IMPLANTS: Chronic | ICD-10-CM

## 2025-07-25 DIAGNOSIS — Z96.651 PRESENCE OF RIGHT ARTIFICIAL KNEE JOINT: Chronic | ICD-10-CM

## 2025-07-25 DIAGNOSIS — Z90.49 ACQUIRED ABSENCE OF OTHER SPECIFIED PARTS OF DIGESTIVE TRACT: Chronic | ICD-10-CM

## 2025-07-25 PROCEDURE — 73562 X-RAY EXAM OF KNEE 3: CPT

## 2025-07-25 PROCEDURE — 73562 X-RAY EXAM OF KNEE 3: CPT | Mod: 26,LT

## 2025-07-25 PROCEDURE — 99285 EMERGENCY DEPT VISIT HI MDM: CPT

## 2025-07-25 RX ORDER — KETOROLAC TROMETHAMINE 30 MG/ML
15 INJECTION, SOLUTION INTRAMUSCULAR; INTRAVENOUS ONCE
Refills: 0 | Status: DISCONTINUED | OUTPATIENT
Start: 2025-07-25 | End: 2025-07-25

## 2025-07-25 NOTE — ED PROVIDER NOTE - OBJECTIVE STATEMENT
Patient is a 60-year-old lady with a past medical history of hyperlipidemia, CAD, psoriatic arthritis, rheumatoid arthritis, fibromyalgia who presents to the ED because of left knee pain since last October.  She had knee replacement at that time, complicated by infection in December.  Had finished antibiotics, and comes into the ED because of continued knee pain.  No fever, no trauma, said that she has been having a rash along her lower legs as well.  Saw her orthopedic surgeon 3 weeks ago, was told pain was unlikely to be from the surgical hardware and no signs of infection.

## 2025-07-25 NOTE — ED PROVIDER NOTE - CLINICAL SUMMARY MEDICAL DECISION MAKING FREE TEXT BOX
Ddx: Arthritis/ no evidence of septic joint/ no trauma to suggest fracture  Plan: Cbc, cmp, esr, crp, xray, pain control, likely d/c

## 2025-07-25 NOTE — ED PROVIDER NOTE - NSFOLLOWUPINSTRUCTIONS_ED_ALL_ED_FT
You were seen in the emergency department for: knee pain  Your results report is attached - everything was normal.   Please take Tylenol 1000mg every 6 hours as needed or Ibuprofen 600mg every 6 hours as needed - you can alternate every 3 hours as needed.   We recommend you follow up with: your orthopedist as soon as possible    Please return to the Emergency Department if you experience any of the following symptoms:   - Shortness of breath or trouble breathing  - Pressure, pain or tightness in the chest  - Face drooping, arm weakness or speech difficulty  - Persistence of severe vomiting  - Head injury or loss of consciousness  - Nonstop bleeding or an open wound    (1) Follow up with your primary care physician within the next 24-48 hours as discussed. In addition, we did not find evidence of a life threatening illness on your testing here today, but listed below are the specialists that will be necessary to see as an outpatient to continue the workup.  Please call the numbers listed below or [phone] to set up the necessary appointments.  (2) Take Tylenol (up to 1000mg or 1 g)  and/or Motrin (up to 600mg) up to every 6 hours as needed for pain.   (3) If you had an IV (intravenous) line placed, it was removed. Sometimes, after IV removal, that area can be tender for a few days; if it develops redness and swelling, those could be signs of infection; in which case, return to the Emergency Department for assessment.  (4) Please continue taking all of your home medications as directed.

## 2025-07-25 NOTE — ED PROVIDER NOTE - PATIENT PORTAL LINK FT
You can access the FollowMyHealth Patient Portal offered by [facility] by registering at the following website: [email or web address]. By joining [employer]’s FollowMyHealth portal, you will also be able to view your health information using other applications (apps) compatible with our system.

## 2025-07-25 NOTE — ED ADULT TRIAGE NOTE - CHIEF COMPLAINT QUOTE
Pt reports that  she has  skin Rashes and infection of the  left  knee  s/p left knee replacement last october . Pt had  similar infections twice before and was treated with antibiotics

## 2025-07-26 VITALS
HEART RATE: 90 BPM | DIASTOLIC BLOOD PRESSURE: 71 MMHG | TEMPERATURE: 99 F | RESPIRATION RATE: 18 BRPM | OXYGEN SATURATION: 93 % | SYSTOLIC BLOOD PRESSURE: 112 MMHG

## 2025-07-26 PROBLEM — M79.7 FIBROMYALGIA: Chronic | Status: ACTIVE | Noted: 2024-10-24

## 2025-07-26 PROBLEM — M17.11 UNILATERAL PRIMARY OSTEOARTHRITIS, RIGHT KNEE: Chronic | Status: ACTIVE | Noted: 2024-10-23

## 2025-07-26 PROBLEM — J30.9 ALLERGIC RHINITIS, UNSPECIFIED: Chronic | Status: ACTIVE | Noted: 2024-10-24

## 2025-07-26 PROBLEM — G43.909 MIGRAINE, UNSPECIFIED, NOT INTRACTABLE, WITHOUT STATUS MIGRAINOSUS: Chronic | Status: ACTIVE | Noted: 2024-10-23

## 2025-07-26 PROBLEM — K21.9 GASTRO-ESOPHAGEAL REFLUX DISEASE WITHOUT ESOPHAGITIS: Chronic | Status: ACTIVE | Noted: 2024-10-24

## 2025-07-26 PROBLEM — I25.10 ATHEROSCLEROTIC HEART DISEASE OF NATIVE CORONARY ARTERY WITHOUT ANGINA PECTORIS: Chronic | Status: ACTIVE | Noted: 2024-10-24

## 2025-07-26 LAB
ALBUMIN SERPL ELPH-MCNC: 3.5 G/DL — SIGNIFICANT CHANGE UP (ref 3.5–5)
ALP SERPL-CCNC: 51 U/L — SIGNIFICANT CHANGE UP (ref 40–120)
ALT FLD-CCNC: 32 U/L DA — SIGNIFICANT CHANGE UP (ref 10–60)
ANION GAP SERPL CALC-SCNC: 5 MMOL/L — SIGNIFICANT CHANGE UP (ref 5–17)
AST SERPL-CCNC: 26 U/L — SIGNIFICANT CHANGE UP (ref 10–40)
BASOPHILS # BLD AUTO: 0 K/UL — SIGNIFICANT CHANGE UP (ref 0–0.2)
BASOPHILS NFR BLD AUTO: 0 % — SIGNIFICANT CHANGE UP (ref 0–2)
BILIRUB SERPL-MCNC: 0.7 MG/DL — SIGNIFICANT CHANGE UP (ref 0.2–1.2)
BUN SERPL-MCNC: 11 MG/DL — SIGNIFICANT CHANGE UP (ref 7–18)
CALCIUM SERPL-MCNC: 8.8 MG/DL — SIGNIFICANT CHANGE UP (ref 8.4–10.5)
CHLORIDE SERPL-SCNC: 107 MMOL/L — SIGNIFICANT CHANGE UP (ref 96–108)
CO2 SERPL-SCNC: 27 MMOL/L — SIGNIFICANT CHANGE UP (ref 22–31)
CREAT SERPL-MCNC: 0.76 MG/DL — SIGNIFICANT CHANGE UP (ref 0.5–1.3)
CRP SERPL-MCNC: <2.9 MG/L — SIGNIFICANT CHANGE UP (ref 0–5)
EGFR: 90 ML/MIN/1.73M2 — SIGNIFICANT CHANGE UP
EGFR: 90 ML/MIN/1.73M2 — SIGNIFICANT CHANGE UP
EOSINOPHIL # BLD AUTO: 0.47 K/UL — SIGNIFICANT CHANGE UP (ref 0–0.5)
EOSINOPHIL NFR BLD AUTO: 4 % — SIGNIFICANT CHANGE UP (ref 0–6)
ERYTHROCYTE [SEDIMENTATION RATE] IN BLOOD: 19 MM/HR — SIGNIFICANT CHANGE UP (ref 0–20)
GLUCOSE SERPL-MCNC: 107 MG/DL — HIGH (ref 70–99)
HCT VFR BLD CALC: 35.5 % — SIGNIFICANT CHANGE UP (ref 34.5–45)
HGB BLD-MCNC: 12 G/DL — SIGNIFICANT CHANGE UP (ref 11.5–15.5)
LYMPHOCYTES # BLD AUTO: 55 % — HIGH (ref 13–44)
LYMPHOCYTES # BLD AUTO: 6.42 K/UL — HIGH (ref 1–3.3)
MANUAL SMEAR VERIFICATION: SIGNIFICANT CHANGE UP
MCHC RBC-ENTMCNC: 29.9 PG — SIGNIFICANT CHANGE UP (ref 27–34)
MCHC RBC-ENTMCNC: 33.8 G/DL — SIGNIFICANT CHANGE UP (ref 32–36)
MCV RBC AUTO: 88.3 FL — SIGNIFICANT CHANGE UP (ref 80–100)
MICROCYTES BLD QL: SLIGHT — SIGNIFICANT CHANGE UP
MONOCYTES # BLD AUTO: 1.28 K/UL — HIGH (ref 0–0.9)
MONOCYTES NFR BLD AUTO: 11 % — SIGNIFICANT CHANGE UP (ref 2–14)
NEUTROPHILS # BLD AUTO: 3.5 K/UL — SIGNIFICANT CHANGE UP (ref 1.8–7.4)
NEUTROPHILS NFR BLD AUTO: 30 % — LOW (ref 43–77)
NRBC # BLD: 0 /100 WBCS — SIGNIFICANT CHANGE UP (ref 0–0)
NRBC BLD-RTO: 0 /100 WBCS — SIGNIFICANT CHANGE UP (ref 0–0)
PLAT MORPH BLD: NORMAL — SIGNIFICANT CHANGE UP
PLATELET # BLD AUTO: 291 K/UL — SIGNIFICANT CHANGE UP (ref 150–400)
PLATELET COUNT - ESTIMATE: NORMAL — SIGNIFICANT CHANGE UP
POLYCHROMASIA BLD QL SMEAR: SLIGHT — SIGNIFICANT CHANGE UP
POTASSIUM SERPL-MCNC: 4.5 MMOL/L — SIGNIFICANT CHANGE UP (ref 3.5–5.3)
POTASSIUM SERPL-SCNC: 4.5 MMOL/L — SIGNIFICANT CHANGE UP (ref 3.5–5.3)
PROT SERPL-MCNC: 7.8 G/DL — SIGNIFICANT CHANGE UP (ref 6–8.3)
RBC # BLD: 4.02 M/UL — SIGNIFICANT CHANGE UP (ref 3.8–5.2)
RBC # FLD: 13.7 % — SIGNIFICANT CHANGE UP (ref 10.3–14.5)
RBC BLD AUTO: ABNORMAL
SODIUM SERPL-SCNC: 139 MMOL/L — SIGNIFICANT CHANGE UP (ref 135–145)
STOMATOCYTES BLD QL SMEAR: SLIGHT — SIGNIFICANT CHANGE UP
WBC # BLD: 11.68 K/UL — HIGH (ref 3.8–10.5)
WBC # FLD AUTO: 11.68 K/UL — HIGH (ref 3.8–10.5)

## 2025-07-26 PROCEDURE — 73562 X-RAY EXAM OF KNEE 3: CPT

## 2025-07-26 PROCEDURE — 85025 COMPLETE CBC W/AUTO DIFF WBC: CPT

## 2025-07-26 PROCEDURE — 85652 RBC SED RATE AUTOMATED: CPT

## 2025-07-26 PROCEDURE — 80053 COMPREHEN METABOLIC PANEL: CPT

## 2025-07-26 PROCEDURE — 86140 C-REACTIVE PROTEIN: CPT

## 2025-07-26 PROCEDURE — 36415 COLL VENOUS BLD VENIPUNCTURE: CPT

## 2025-07-26 PROCEDURE — 99284 EMERGENCY DEPT VISIT MOD MDM: CPT | Mod: 25

## 2025-07-26 PROCEDURE — 96374 THER/PROPH/DIAG INJ IV PUSH: CPT

## 2025-07-26 RX ADMIN — KETOROLAC TROMETHAMINE 15 MILLIGRAM(S): 30 INJECTION, SOLUTION INTRAMUSCULAR; INTRAVENOUS at 00:04

## 2025-07-26 RX ADMIN — KETOROLAC TROMETHAMINE 15 MILLIGRAM(S): 30 INJECTION, SOLUTION INTRAMUSCULAR; INTRAVENOUS at 00:34

## 2025-07-26 NOTE — ED ADULT NURSE NOTE - NSFALLUNIVINTERV_ED_ALL_ED
Bed/Stretcher in lowest position, wheels locked, appropriate side rails in place/Call bell, personal items and telephone in reach/Instruct patient to call for assistance before getting out of bed/chair/stretcher/Non-slip footwear applied when patient is off stretcher/[place] to call system/Physically safe environment - no spills, clutter or unnecessary equipment/Purposeful proactive rounding/Room/bathroom lighting operational, light cord in reach

## 2025-07-26 NOTE — ED ADULT NURSE NOTE - OBJECTIVE STATEMENT
Patient is a 59y/o female presenting to the ED c/o Pt reports that  she has  skin Rashes and infection of the  left  knee  s/p left knee replacement last october . Pt had  similar infections twice before and was treated with antibiotics

## (undated) DEVICE — POSITIONER STIRRUP STRAP W SLIP RING 19X3.5"

## (undated) DEVICE — SOL INJ NS 0.9% 500ML 1-PORT

## (undated) DEVICE — SAW BLADE STRYKER SAGITTAL DUAL CUT 18MMX90MMX1.27MM

## (undated) DEVICE — STRYKER MIXEVAC 3 BONE CEMENT MIXER

## (undated) DEVICE — DRAPE LIGHT HANDLE COVER (BLUE)

## (undated) DEVICE — VENODYNE/SCD SLEEVE CALF MEDIUM

## (undated) DEVICE — DRAPE IOBAN 33" X 23"

## (undated) DEVICE — FOR-ESU VALLEYLAB T7E15009DX: Type: DURABLE MEDICAL EQUIPMENT

## (undated) DEVICE — SYR LUER LOK 20CC

## (undated) DEVICE — GLV 8 PROTEXIS (CREAM) MICRO

## (undated) DEVICE — SOL IRR BAG NS 0.9% 3000ML

## (undated) DEVICE — Device

## (undated) DEVICE — DRAPE TOWEL BLUE 17" X 24"

## (undated) DEVICE — SOL INJ NS 0.9% 100ML

## (undated) DEVICE — DRAPE SHOWER CURTAIN ISOLATION

## (undated) DEVICE — ELCTR AQUAMANTYS BIPOLAR SEALER 6.0

## (undated) DEVICE — TAPE SILK 3"

## (undated) DEVICE — GOWN XXL

## (undated) DEVICE — TOURNIQUET CUFF 34" DUAL PORT W PLC

## (undated) DEVICE — FOR-TOURNIQUET 27679911: Type: DURABLE MEDICAL EQUIPMENT

## (undated) DEVICE — SUT POLYSORB 2-0 30" GS-10 UNDYED

## (undated) DEVICE — ELCTR GROUNDING PAD ADULT COVIDIEN

## (undated) DEVICE — HOOD FLYTE STRYKER HELMET SHIELD

## (undated) DEVICE — GLV 8.5 PROTEXIS (BLUE)

## (undated) DEVICE — NDL HYPO SAFE 22G X 1.5" (BLACK)

## (undated) DEVICE — SOL IRR POUR H2O 1500ML

## (undated) DEVICE — SUT POLYSORB 1 18" UNDYED

## (undated) DEVICE — WARMING BLANKET UPPER ADULT